# Patient Record
Sex: FEMALE | Race: WHITE | NOT HISPANIC OR LATINO | Employment: FULL TIME | URBAN - METROPOLITAN AREA
[De-identification: names, ages, dates, MRNs, and addresses within clinical notes are randomized per-mention and may not be internally consistent; named-entity substitution may affect disease eponyms.]

---

## 2018-09-06 ENCOUNTER — HISTORICAL (OUTPATIENT)
Dept: ADMINISTRATIVE | Facility: HOSPITAL | Age: 53
End: 2018-09-06

## 2018-09-21 ENCOUNTER — HISTORICAL (OUTPATIENT)
Dept: ADMINISTRATIVE | Facility: HOSPITAL | Age: 53
End: 2018-09-21

## 2018-09-21 LAB
ABS NEUT (OLG): 4
ALBUMIN SERPL-MCNC: 4.1 GM/DL (ref 3.4–5)
ALBUMIN/GLOB SERPL: 1.41 {RATIO} (ref 1.5–2.5)
ALP SERPL-CCNC: 50 UNIT/L (ref 38–126)
ALT SERPL-CCNC: 13 UNIT/L (ref 7–52)
APPEARANCE, UA: CLEAR
AST SERPL-CCNC: 14 UNIT/L (ref 15–37)
BACTERIA #/AREA URNS AUTO: ABNORMAL /HPF
BILIRUB SERPL-MCNC: 0.6 MG/DL (ref 0.2–1)
BILIRUB UR QL STRIP: ABNORMAL MG/DL
BILIRUBIN DIRECT+TOT PNL SERPL-MCNC: 0.2 MG/DL (ref 0–0.5)
BILIRUBIN DIRECT+TOT PNL SERPL-MCNC: 0.4 MG/DL
BUN SERPL-MCNC: 13 MG/DL (ref 7–18)
CALCIUM SERPL-MCNC: 9.1 MG/DL (ref 8.5–10)
CHLORIDE SERPL-SCNC: 102 MMOL/L (ref 98–107)
CHOLEST SERPL-MCNC: 211 MG/DL (ref 0–200)
CHOLEST/HDLC SERPL: 5 {RATIO}
CO2 SERPL-SCNC: 30 MMOL/L (ref 21–32)
COLOR UR: YELLOW
CREAT SERPL-MCNC: 0.78 MG/DL (ref 0.6–1.3)
ERYTHROCYTE [DISTWIDTH] IN BLOOD BY AUTOMATED COUNT: 13 % (ref 11.5–17)
EST. AVERAGE GLUCOSE BLD GHB EST-MCNC: 100 MG/DL
GLOBULIN SER-MCNC: 2.9 GM/DL (ref 1.2–3)
GLUCOSE (UA): NEGATIVE MG/DL
GLUCOSE SERPL-MCNC: 93 MG/DL (ref 74–106)
HBA1C MFR BLD: 5.1 % (ref 4.4–6.4)
HCT VFR BLD AUTO: 43.6 % (ref 37–47)
HDLC SERPL-MCNC: 42 MG/DL (ref 35–60)
HGB BLD-MCNC: 13.8 GM/DL (ref 12–16)
HGB UR QL STRIP: NEGATIVE UNIT/L
KETONES UR QL STRIP: NEGATIVE MG/DL
LDLC SERPL CALC-MCNC: 120 MG/DL (ref 0–129)
LEUKOCYTE ESTERASE UR QL STRIP: NEGATIVE UNIT/L
LYMPHOCYTES # BLD AUTO: 1.3 X10(3)/MCL (ref 0.6–3.4)
LYMPHOCYTES NFR BLD AUTO: 22.3 % (ref 13–40)
MCH RBC QN AUTO: 30.3 PG (ref 27–31.2)
MCHC RBC AUTO-ENTMCNC: 32 GM/DL (ref 32–36)
MCV RBC AUTO: 96 FL (ref 80–94)
MONOCYTES # BLD AUTO: 0.6 X10(3)/MCL (ref 0–1.8)
MONOCYTES NFR BLD AUTO: 10.2 % (ref 0.1–24)
NEUTROPHILS NFR BLD AUTO: 67.5 % (ref 47–80)
NITRITE UR QL STRIP.AUTO: NEGATIVE
PH UR STRIP: 7 [PH]
PLATELET # BLD AUTO: 265 X10(3)/MCL (ref 130–400)
PMV BLD AUTO: 9.3 FL
POTASSIUM SERPL-SCNC: 4.1 MMOL/L (ref 3.5–5.1)
PROT SERPL-MCNC: 7 GM/DL (ref 6.4–8.2)
PROT UR QL STRIP: NEGATIVE MG/DL
RBC # BLD AUTO: 4.55 X10(6)/MCL (ref 4.2–5.4)
RBC #/AREA URNS HPF: ABNORMAL /HPF
SODIUM SERPL-SCNC: 138 MMOL/L (ref 136–145)
SP GR UR STRIP: 1.01
SQUAMOUS EPITHELIAL, UA: ABNORMAL /LPF
TRIGL SERPL-MCNC: 103 MG/DL (ref 30–150)
TSH SERPL-ACNC: 0.86 MIU/ML (ref 0.35–4.94)
UROBILINOGEN UR STRIP-ACNC: 1 MG/DL
VLDLC SERPL CALC-MCNC: 20.6 MG/DL
WBC # SPEC AUTO: 5.9 X10(3)/MCL (ref 4.5–11.5)
WBC #/AREA URNS AUTO: ABNORMAL /[HPF]

## 2018-11-06 ENCOUNTER — HISTORICAL (OUTPATIENT)
Dept: ADMINISTRATIVE | Facility: HOSPITAL | Age: 53
End: 2018-11-06

## 2019-01-03 ENCOUNTER — HISTORICAL (OUTPATIENT)
Dept: ADMINISTRATIVE | Facility: HOSPITAL | Age: 54
End: 2019-01-03

## 2019-01-03 LAB
FLUAV AG NPH QL IA: NEGATIVE
FLUBV AG NPH QL IA: NEGATIVE

## 2019-03-22 ENCOUNTER — HISTORICAL (OUTPATIENT)
Dept: ADMINISTRATIVE | Facility: HOSPITAL | Age: 54
End: 2019-03-22

## 2019-03-22 LAB
CHOLEST SERPL-MCNC: 229 MG/DL (ref 0–200)
CHOLEST/HDLC SERPL: 4.9 {RATIO}
HDLC SERPL-MCNC: 47 MG/DL (ref 35–60)
LDLC SERPL CALC-MCNC: 130 MG/DL (ref 0–129)
TRIGL SERPL-MCNC: 114 MG/DL (ref 30–150)
VLDLC SERPL CALC-MCNC: 22.8 MG/DL

## 2019-07-16 ENCOUNTER — HISTORICAL (OUTPATIENT)
Dept: ADMINISTRATIVE | Facility: HOSPITAL | Age: 54
End: 2019-07-16

## 2019-07-16 LAB
CHOLEST SERPL-MCNC: 131 MG/DL (ref 0–200)
CHOLEST/HDLC SERPL: 3.2 {RATIO}
HDLC SERPL-MCNC: 41 MG/DL (ref 35–60)
LDLC SERPL CALC-MCNC: 56 MG/DL (ref 0–129)
TRIGL SERPL-MCNC: 113 MG/DL (ref 30–150)
VLDLC SERPL CALC-MCNC: 22.6 MG/DL

## 2019-09-27 ENCOUNTER — HISTORICAL (OUTPATIENT)
Dept: ADMINISTRATIVE | Facility: HOSPITAL | Age: 54
End: 2019-09-27

## 2019-09-27 LAB
ABS NEUT (OLG): 3.4 X10(3)/MCL (ref 2.1–9.2)
ALBUMIN SERPL-MCNC: 4.2 GM/DL (ref 3.4–5)
ALBUMIN/GLOB SERPL: 1.91 {RATIO} (ref 1.5–2.5)
ALP SERPL-CCNC: 52 UNIT/L (ref 38–126)
ALT SERPL-CCNC: 13 UNIT/L (ref 7–52)
APPEARANCE, UA: CLEAR
AST SERPL-CCNC: 15 UNIT/L (ref 15–37)
BACTERIA #/AREA URNS AUTO: NORMAL /HPF
BILIRUB SERPL-MCNC: 0.8 MG/DL (ref 0.2–1)
BILIRUB UR QL STRIP: NEGATIVE MG/DL
BILIRUBIN DIRECT+TOT PNL SERPL-MCNC: 0.2 MG/DL (ref 0–0.5)
BILIRUBIN DIRECT+TOT PNL SERPL-MCNC: 0.6 MG/DL
BUN SERPL-MCNC: 18 MG/DL (ref 7–18)
CALCIUM SERPL-MCNC: 9.3 MG/DL (ref 8.5–10)
CHLORIDE SERPL-SCNC: 104 MMOL/L (ref 98–107)
CHOLEST SERPL-MCNC: 210 MG/DL (ref 0–200)
CHOLEST/HDLC SERPL: 3.8 {RATIO}
CO2 SERPL-SCNC: 30 MMOL/L (ref 21–32)
COLOR UR: YELLOW
CREAT SERPL-MCNC: 0.76 MG/DL (ref 0.6–1.3)
ERYTHROCYTE [DISTWIDTH] IN BLOOD BY AUTOMATED COUNT: 13.1 % (ref 11.5–17)
GLOBULIN SER-MCNC: 2.2 GM/DL (ref 1.2–3)
GLUCOSE (UA): NEGATIVE MG/DL
GLUCOSE SERPL-MCNC: 98 MG/DL (ref 74–106)
HCT VFR BLD AUTO: 40.6 % (ref 37–47)
HDLC SERPL-MCNC: 55 MG/DL (ref 35–60)
HGB BLD-MCNC: 14 GM/DL (ref 12–16)
HGB UR QL STRIP: NEGATIVE UNIT/L
KETONES UR QL STRIP: NEGATIVE MG/DL
LDLC SERPL CALC-MCNC: 137 MG/DL (ref 0–129)
LEUKOCYTE ESTERASE UR QL STRIP: NEGATIVE UNIT/L
LYMPHOCYTES # BLD AUTO: 1.9 X10(3)/MCL (ref 0.6–3.4)
LYMPHOCYTES NFR BLD AUTO: 34.8 % (ref 13–40)
MCH RBC QN AUTO: 31.2 PG (ref 27–31.2)
MCHC RBC AUTO-ENTMCNC: 34 GM/DL (ref 32–36)
MCV RBC AUTO: 90 FL (ref 80–94)
MONOCYTES # BLD AUTO: 0.2 X10(3)/MCL (ref 0.1–1.3)
MONOCYTES NFR BLD AUTO: 3.1 % (ref 0.1–24)
NEUTROPHILS NFR BLD AUTO: 62.1 % (ref 47–80)
NITRITE UR QL STRIP.AUTO: NEGATIVE
PH UR STRIP: 7 [PH]
PLATELET # BLD AUTO: 257 X10(3)/MCL (ref 130–400)
PMV BLD AUTO: 9.6 FL (ref 9.4–12.4)
POTASSIUM SERPL-SCNC: 4.7 MMOL/L (ref 3.5–5.1)
PROT SERPL-MCNC: 6.4 GM/DL (ref 6.4–8.2)
PROT UR QL STRIP: NEGATIVE MG/DL
RBC # BLD AUTO: 4.49 X10(6)/MCL (ref 4.2–5.4)
RBC #/AREA URNS HPF: NORMAL /HPF
SODIUM SERPL-SCNC: 141 MMOL/L (ref 136–145)
SP GR UR STRIP: 1.02
SQUAMOUS EPITHELIAL, UA: NORMAL /LPF
TRIGL SERPL-MCNC: 103 MG/DL (ref 30–150)
UROBILINOGEN UR STRIP-ACNC: 0.2 MG/DL
VLDLC SERPL CALC-MCNC: 20.6 MG/DL
WBC # SPEC AUTO: 5.5 X10(3)/MCL (ref 4.5–11.5)
WBC #/AREA URNS AUTO: NORMAL /[HPF]

## 2020-03-16 ENCOUNTER — HISTORICAL (OUTPATIENT)
Dept: ADMINISTRATIVE | Facility: HOSPITAL | Age: 55
End: 2020-03-16

## 2020-03-16 LAB
FLUAV AG NPH QL IA: NEGATIVE
FLUBV AG NPH QL IA: NEGATIVE

## 2020-10-16 ENCOUNTER — HISTORICAL (OUTPATIENT)
Dept: ADMINISTRATIVE | Facility: HOSPITAL | Age: 55
End: 2020-10-16

## 2020-10-16 LAB
ABS NEUT (OLG): 3.5 X10(3)/MCL (ref 2.1–9.2)
ALBUMIN SERPL-MCNC: 4.3 GM/DL (ref 3.4–5)
ALBUMIN/GLOB SERPL: 1.54 {RATIO} (ref 1.5–2.5)
ALP SERPL-CCNC: 55 UNIT/L (ref 38–126)
ALT SERPL-CCNC: 13 UNIT/L (ref 7–52)
APPEARANCE, UA: CLEAR
AST SERPL-CCNC: 17 UNIT/L (ref 15–37)
BACTERIA #/AREA URNS AUTO: NORMAL /HPF
BILIRUB SERPL-MCNC: 0.6 MG/DL (ref 0.2–1)
BILIRUB UR QL STRIP: NEGATIVE MG/DL
BILIRUBIN DIRECT+TOT PNL SERPL-MCNC: 0.1 MG/DL (ref 0–0.5)
BILIRUBIN DIRECT+TOT PNL SERPL-MCNC: 0.5 MG/DL
BUN SERPL-MCNC: 22 MG/DL (ref 7–18)
CALCIUM SERPL-MCNC: 9.7 MG/DL (ref 8.5–10)
CHLORIDE SERPL-SCNC: 100 MMOL/L (ref 98–107)
CHOLEST SERPL-MCNC: 227 MG/DL (ref 0–200)
CHOLEST/HDLC SERPL: 3.8 {RATIO}
CO2 SERPL-SCNC: 31 MMOL/L (ref 21–32)
COLOR UR: YELLOW
CREAT SERPL-MCNC: 0.8 MG/DL (ref 0.6–1.3)
ERYTHROCYTE [DISTWIDTH] IN BLOOD BY AUTOMATED COUNT: 13.4 % (ref 11.5–17)
GLOBULIN SER-MCNC: 2.8 GM/DL (ref 1.2–3)
GLUCOSE (UA): NEGATIVE MG/DL
GLUCOSE SERPL-MCNC: 92 MG/DL (ref 74–106)
HCT VFR BLD AUTO: 42.6 % (ref 37–47)
HDLC SERPL-MCNC: 60 MG/DL (ref 35–60)
HGB BLD-MCNC: 14.4 GM/DL (ref 12–16)
HGB UR QL STRIP: NEGATIVE UNIT/L
KETONES UR QL STRIP: NEGATIVE MG/DL
LDLC SERPL CALC-MCNC: 139 MG/DL (ref 0–129)
LEUKOCYTE ESTERASE UR QL STRIP: NEGATIVE UNIT/L
LYMPHOCYTES # BLD AUTO: 1.9 X10(3)/MCL (ref 0.6–3.4)
LYMPHOCYTES NFR BLD AUTO: 33.8 % (ref 13–40)
MCH RBC QN AUTO: 30.5 PG (ref 27–31.2)
MCHC RBC AUTO-ENTMCNC: 34 GM/DL (ref 32–36)
MCV RBC AUTO: 90 FL (ref 80–94)
MONOCYTES # BLD AUTO: 0.2 X10(3)/MCL (ref 0.1–1.3)
MONOCYTES NFR BLD AUTO: 3.2 % (ref 0.1–24)
NEUTROPHILS NFR BLD AUTO: 63 % (ref 47–80)
NITRITE UR QL STRIP.AUTO: NEGATIVE
PH UR STRIP: 6 [PH]
PLATELET # BLD AUTO: 309 X10(3)/MCL (ref 130–400)
PMV BLD AUTO: 9.5 FL (ref 9.4–12.4)
POTASSIUM SERPL-SCNC: 4.1 MMOL/L (ref 3.5–5.1)
PROT SERPL-MCNC: 7.1 GM/DL (ref 6.4–8.2)
PROT UR QL STRIP: NEGATIVE MG/DL
RBC # BLD AUTO: 4.72 X10(6)/MCL (ref 4.2–5.4)
RBC #/AREA URNS HPF: NORMAL /HPF
SODIUM SERPL-SCNC: 140 MMOL/L (ref 136–145)
SP GR UR STRIP: 1.02
SQUAMOUS EPITHELIAL, UA: NORMAL /LPF
TRIGL SERPL-MCNC: 111 MG/DL (ref 30–150)
TSH SERPL-ACNC: 0.83 MIU/ML (ref 0.35–4.94)
UROBILINOGEN UR STRIP-ACNC: 1 MG/DL
VLDLC SERPL CALC-MCNC: 22.2 MG/DL
WBC # SPEC AUTO: 5.6 X10(3)/MCL (ref 4.5–11.5)
WBC #/AREA URNS AUTO: NORMAL /[HPF]

## 2021-02-23 ENCOUNTER — HISTORICAL (OUTPATIENT)
Dept: ADMINISTRATIVE | Facility: HOSPITAL | Age: 56
End: 2021-02-23

## 2021-02-23 LAB
ALBUMIN SERPL-MCNC: 4 GM/DL (ref 3.4–5)
ALBUMIN/GLOB SERPL: 1.67 {RATIO} (ref 1.5–2.5)
ALP SERPL-CCNC: 54 UNIT/L (ref 38–126)
ALT SERPL-CCNC: 13 UNIT/L (ref 7–52)
AST SERPL-CCNC: 20 UNIT/L (ref 15–37)
BILIRUB SERPL-MCNC: 0.4 MG/DL (ref 0.2–1)
BILIRUBIN DIRECT+TOT PNL SERPL-MCNC: 0.1 MG/DL (ref 0–0.5)
BILIRUBIN DIRECT+TOT PNL SERPL-MCNC: 0.3 MG/DL
BUN SERPL-MCNC: 17 MG/DL (ref 7–18)
CALCIUM SERPL-MCNC: 9.3 MG/DL (ref 8.5–10)
CHLORIDE SERPL-SCNC: 103 MMOL/L (ref 98–107)
CHOLEST SERPL-MCNC: 234 MG/DL (ref 0–200)
CHOLEST/HDLC SERPL: 4.9 {RATIO}
CO2 SERPL-SCNC: 29 MMOL/L (ref 21–32)
CREAT SERPL-MCNC: 0.92 MG/DL (ref 0.6–1.3)
GLOBULIN SER-MCNC: 2.4 GM/DL (ref 1.2–3)
GLUCOSE SERPL-MCNC: 95 MG/DL (ref 74–106)
HDLC SERPL-MCNC: 48 MG/DL (ref 35–60)
LDLC SERPL CALC-MCNC: 141 MG/DL (ref 0–129)
POTASSIUM SERPL-SCNC: 4.5 MMOL/L (ref 3.5–5.1)
PROT SERPL-MCNC: 6.4 GM/DL (ref 6.4–8.2)
SODIUM SERPL-SCNC: 139 MMOL/L (ref 136–145)
TRIGL SERPL-MCNC: 145 MG/DL (ref 30–150)
VLDLC SERPL CALC-MCNC: 29 MG/DL

## 2021-10-26 ENCOUNTER — HISTORICAL (OUTPATIENT)
Dept: ADMINISTRATIVE | Facility: HOSPITAL | Age: 56
End: 2021-10-26

## 2021-10-26 LAB
ABS NEUT (OLG): 3.7 X10(3)/MCL (ref 2.1–9.2)
ALBUMIN SERPL-MCNC: 3.9 GM/DL (ref 3.4–5)
ALBUMIN/GLOB SERPL: 1.44 {RATIO} (ref 1.5–2.5)
ALP SERPL-CCNC: 60 UNIT/L (ref 38–126)
ALT SERPL-CCNC: 16 UNIT/L (ref 7–52)
APPEARANCE, UA: CLEAR
AST SERPL-CCNC: 20 UNIT/L (ref 15–37)
BACTERIA #/AREA URNS AUTO: ABNORMAL /HPF
BILIRUB SERPL-MCNC: 0.6 MG/DL (ref 0.2–1)
BILIRUB UR QL STRIP: NEGATIVE MG/DL
BILIRUBIN DIRECT+TOT PNL SERPL-MCNC: 0.1 MG/DL (ref 0–0.5)
BILIRUBIN DIRECT+TOT PNL SERPL-MCNC: 0.5 MG/DL
BUN SERPL-MCNC: 16 MG/DL (ref 7–18)
CALCIUM SERPL-MCNC: 9.7 MG/DL (ref 8.5–10)
CHLORIDE SERPL-SCNC: 103 MMOL/L (ref 98–107)
CHOLEST SERPL-MCNC: 177 MG/DL (ref 0–200)
CHOLEST/HDLC SERPL: 3.5 {RATIO}
CO2 SERPL-SCNC: 32 MMOL/L (ref 21–32)
COLOR UR: YELLOW
CREAT SERPL-MCNC: 0.77 MG/DL (ref 0.6–1.3)
ERYTHROCYTE [DISTWIDTH] IN BLOOD BY AUTOMATED COUNT: 12.6 % (ref 11.5–17)
GLOBULIN SER-MCNC: 2.7 GM/DL (ref 1.2–3)
GLUCOSE (UA): NEGATIVE MG/DL
GLUCOSE SERPL-MCNC: 103 MG/DL (ref 74–106)
HCT VFR BLD AUTO: 40.4 % (ref 37–47)
HDLC SERPL-MCNC: 50 MG/DL (ref 35–60)
HGB BLD-MCNC: 13.7 GM/DL (ref 12–16)
HGB UR QL STRIP: NEGATIVE UNIT/L
KETONES UR QL STRIP: NEGATIVE MG/DL
LDLC SERPL CALC-MCNC: 75 MG/DL (ref 0–129)
LEUKOCYTE ESTERASE UR QL STRIP: NEGATIVE UNIT/L
LYMPHOCYTES # BLD AUTO: 1.2 X10(3)/MCL (ref 0.6–3.4)
LYMPHOCYTES NFR BLD AUTO: 22.3 % (ref 13–40)
MCH RBC QN AUTO: 30.6 PG (ref 27–31.2)
MCHC RBC AUTO-ENTMCNC: 34 GM/DL (ref 32–36)
MCV RBC AUTO: 90 FL (ref 80–94)
MONOCYTES # BLD AUTO: 0.4 X10(3)/MCL (ref 0.1–1.3)
MONOCYTES NFR BLD AUTO: 7.5 % (ref 0.1–24)
NEUTROPHILS NFR BLD AUTO: 70.2 % (ref 47–80)
NITRITE UR QL STRIP.AUTO: NEGATIVE
PH UR STRIP: 7 [PH]
PLATELET # BLD AUTO: 277 X10(3)/MCL (ref 130–400)
PMV BLD AUTO: 9.5 FL (ref 9.4–12.4)
POTASSIUM SERPL-SCNC: 4.7 MMOL/L (ref 3.5–5.1)
PROT SERPL-MCNC: 6.6 GM/DL (ref 6.4–8.2)
PROT UR QL STRIP: NEGATIVE MG/DL
RBC # BLD AUTO: 4.47 X10(6)/MCL (ref 4.2–5.4)
RBC #/AREA URNS HPF: ABNORMAL /HPF
SODIUM SERPL-SCNC: 141 MMOL/L (ref 136–145)
SP GR UR STRIP: 1.02
SQUAMOUS EPITHELIAL, UA: ABNORMAL /LPF
TRIGL SERPL-MCNC: 128 MG/DL (ref 30–150)
TSH SERPL-ACNC: 1.21 MIU/ML (ref 0.35–4.94)
UROBILINOGEN UR STRIP-ACNC: 0.2 MG/DL
VLDLC SERPL CALC-MCNC: 25.6 MG/DL
WBC # SPEC AUTO: 5.3 X10(3)/MCL (ref 4.5–11.5)
WBC #/AREA URNS AUTO: ABNORMAL /[HPF]

## 2021-11-18 LAB
HUMAN PAPILLOMAVIRUS (HPV): NORMAL
PAP RECOMMENDATION EXT: NORMAL
PAP SMEAR: NORMAL

## 2022-02-11 LAB — BCS RECOMMENDATION EXT: NORMAL

## 2022-04-11 ENCOUNTER — HISTORICAL (OUTPATIENT)
Dept: ADMINISTRATIVE | Facility: HOSPITAL | Age: 57
End: 2022-04-11

## 2022-04-27 VITALS
BODY MASS INDEX: 33.72 KG/M2 | OXYGEN SATURATION: 99 % | WEIGHT: 202.38 LBS | DIASTOLIC BLOOD PRESSURE: 60 MMHG | HEIGHT: 65 IN | SYSTOLIC BLOOD PRESSURE: 110 MMHG

## 2022-10-28 PROBLEM — E78.00 HYPERCHOLESTEREMIA: Status: ACTIVE | Noted: 2022-10-28

## 2022-10-28 PROBLEM — Z00.00 ENCOUNTER FOR WELLNESS EXAMINATION IN ADULT: Status: ACTIVE | Noted: 2022-10-28

## 2022-10-28 PROBLEM — R19.7 DIARRHEA OF PRESUMED INFECTIOUS ORIGIN: Status: ACTIVE | Noted: 2022-10-28

## 2022-10-28 PROBLEM — R53.83 FATIGUE: Status: ACTIVE | Noted: 2022-10-28

## 2022-10-28 PROBLEM — M79.672 PAIN OF LEFT HEEL: Status: ACTIVE | Noted: 2022-10-28

## 2022-10-28 PROBLEM — Z23 IMMUNIZATION DUE: Status: ACTIVE | Noted: 2022-10-28

## 2022-12-28 ENCOUNTER — DOCUMENTATION ONLY (OUTPATIENT)
Dept: ADMINISTRATIVE | Facility: HOSPITAL | Age: 57
End: 2022-12-28

## 2023-01-05 ENCOUNTER — DOCUMENTATION ONLY (OUTPATIENT)
Dept: ADMINISTRATIVE | Facility: HOSPITAL | Age: 58
End: 2023-01-05

## 2023-01-30 PROBLEM — Z00.00 ENCOUNTER FOR WELLNESS EXAMINATION IN ADULT: Status: RESOLVED | Noted: 2022-10-28 | Resolved: 2023-01-30

## 2023-04-28 PROBLEM — M72.2 PLANTAR FASCIITIS, LEFT: Status: ACTIVE | Noted: 2023-04-28

## 2023-04-28 PROBLEM — E66.09 CLASS 1 OBESITY DUE TO EXCESS CALORIES WITHOUT SERIOUS COMORBIDITY WITH BODY MASS INDEX (BMI) OF 34.0 TO 34.9 IN ADULT: Status: ACTIVE | Noted: 2023-04-28

## 2023-04-28 PROBLEM — R74.8 ELEVATED LIVER ENZYMES: Status: ACTIVE | Noted: 2023-04-28

## 2023-04-28 PROBLEM — E66.811 CLASS 1 OBESITY DUE TO EXCESS CALORIES WITHOUT SERIOUS COMORBIDITY WITH BODY MASS INDEX (BMI) OF 34.0 TO 34.9 IN ADULT: Status: ACTIVE | Noted: 2023-04-28

## 2023-04-28 PROBLEM — M79.605 LEFT LEG PAIN: Status: ACTIVE | Noted: 2023-04-28

## 2023-04-28 PROBLEM — E66.9 OBESITY: Status: ACTIVE | Noted: 2023-04-28

## 2023-04-28 PROBLEM — D72.819 LEUKOPENIA: Status: ACTIVE | Noted: 2023-04-28

## 2023-05-31 PROBLEM — R21 RASH: Status: ACTIVE | Noted: 2023-05-31

## 2023-09-07 ENCOUNTER — PATIENT MESSAGE (OUTPATIENT)
Dept: RESEARCH | Facility: HOSPITAL | Age: 58
End: 2023-09-07

## 2023-11-03 PROBLEM — R60.0 BILATERAL LOWER EXTREMITY EDEMA: Status: ACTIVE | Noted: 2023-11-03

## 2023-11-03 PROBLEM — Z83.3 FAMILY HISTORY OF DIABETES MELLITUS: Status: ACTIVE | Noted: 2023-11-03

## 2024-02-05 PROBLEM — Z00.00 ENCOUNTER FOR WELLNESS EXAMINATION IN ADULT: Status: RESOLVED | Noted: 2022-10-28 | Resolved: 2024-02-05

## 2024-05-01 NOTE — PROGRESS NOTES
"Follow-up (6 month recheck/Wants lab work)       HPI:    Patient presents for 6 month recheck.    She has been feeling well.    Her appetite has been too good.  She has been sleeping okay; she falls asleep well but wakes up after 3 hours. Has restless sleep after that. She gets 6-7 hours a night. Hydroxyzine does help; 2-3 times a week.    She is still dealing with left plantar fasciitis. She is now going to [facility]. She is doing therapy. She has inserts. She also has peroneal tendonitis.    Her headaches have been doing well; she averages 1 a week.     Her depression varies. She is aggravated secondary to inability to lose weight.    She wakes up with reflux 2-3 times a week. No belching. She takes over the counter acid reducers. She would like to resume omeprazole.     She has been out of her fluid pills for some time; she has had ankle swelling.      Current Outpatient Medications   Medication Instructions    ALPRAZolam (XANAX) 0.5 MG tablet TAKE 1/2 (ONE-HALF) TABLET BY MOUTH ONCE DAILY AS NEEDED FOR ANXIETY    aspirin-acetaminophen-caffeine 250-250-65 mg (EXCEDRIN MIGRAINE) 250-250-65 mg per tablet 1 tablet, Oral, Every 6 hours PRN    bumetanide (BUMEX) 1 mg, Oral, Daily PRN    diphenhydrAMINE (BENADRYL) 25 mg, Oral    diphenhydrAMINE-acetaminophen (TYLENOL PM)  mg Tab 1 tablet, Oral, Daily    hydrOXYzine HCL (ATARAX) 25 MG tablet Take 1 tablet by mouth in evening for rest.    omeprazole (PRILOSEC) 40 mg, Oral, Daily    simvastatin (ZOCOR) 20 mg, Oral, Nightly         ROS:    She sees Dr Lundberg in June.  She had a mammogram/ultrasound in March.      PE:    ..Visit Vitals  /73   Pulse 83   Temp 98.2 °F (36.8 °C)   Ht 5' 5" (1.651 m)   Wt 94.3 kg (207 lb 12.8 oz)   SpO2 98%   BMI 34.58 kg/m²        General:  She is a well-developed well-nourished obese white female in no apparent distress she is alert and oriented.    Chest: Clear to auscultation bilaterally.    CV:  Regular rate rhythm without murmurs rubs " or gallops.  Bilateral lower extremities:  2+ nonpitting edema noted over ankles/pretibial areas.        1. Generalized anxiety disorder  Overview:  Patient states her anxiety has been stable; she denies any panic attacks.    Continue alprazolam as needed.    11/03/2023:  She takes 1/2 tablet of alprazolam infrequently.    05/03/2024: Her anxiety has been doing well; she infrequently takes 1/2 tablet of alprazolam.    Orders:  -     hydrOXYzine HCL (ATARAX) 25 MG tablet; Take 1 tablet by mouth in evening for rest.  Dispense: 30 tablet; Refill: 5    2. Persistent depressive disorder  Overview:  She has occasional feelings of sadness and depression but is overall doing well.    She denies any suicidal thoughts.    05/03/2024:  Patient states she gets depressed and aggravated at times secondary to inability to lose weight.  She states her depression is otherwise doing well.  She denies any suicidal thoughts; ER precautions given.      3. Primary insomnia  Overview:  Patient sleeps well with Benadryl; she rarely takes hydroxyzine to sleep.    11/03/2023: Patient sleeps well with hydroxyzine; refills sent.    05/03/2024: Patient has been sleeping okay; she sleeps better when she takes hydroxyzine.  She takes this 2 to 3 times a week; refills sent.      4. Other migraine without status migrainosus, not intractable  Overview:  Her migraines have been infrequent.    She gets good relief with Excedrin migraine over-the-counter.    11/03/2023:  Her headaches increase in frequency with increased anxiety.    05/03/2024: Her headaches have been doing well; she has been averaging approximately 1 a week.          5. Gastroesophageal reflux disease, unspecified whether esophagitis present  Overview:  Her reflux has been well controlled.    05/03/2024:  She has been waking up with reflux 2 to 3 times a week.  She would like to resume omeprazole 40 mg daily; refills sent.      6. Bilateral lower extremity edema  Overview:  Stable;  she had recent ultrasounds of bilateral lower extremities.  Ultrasounds did not reveal any venous insufficiency.  Patient advised to take Bumex as needed; she takes it quite infrequently.    05/03/2024: Patient has been out of Bumex; refills sent.    Patient has had lower extremity edema since being out of her Bumex.    Elevate lower extremities.    Limit sodium consumption.    Orders:  -     Comprehensive Metabolic Panel; Future; Expected date: 05/03/2024  -     BNP; Future; Expected date: 05/03/2024    7. Fluid retention  Overview:  Patient reports increased flu since being out of Bumex; refills sent.    Resume Bumex.    Orders:  -     bumetanide (BUMEX) 1 MG tablet; Take 1 tablet (1 mg total) by mouth daily as needed (Swelling).  Dispense: 30 tablet; Refill: 5  -     Comprehensive Metabolic Panel; Future; Expected date: 05/03/2024  -     BNP; Future; Expected date: 05/03/2024    8. Secondary pulmonary arterial hypertension  Overview:  Stable; followed by Dr. Lundberg.  BNP pending.    Orders:  -     Comprehensive Metabolic Panel; Future; Expected date: 05/03/2024  -     BNP; Future; Expected date: 05/03/2024    9. Atherosclerotic heart disease of native coronary artery with other forms of angina pectoris  Overview:  Stable; patient denies any chest pain/pressure/tightness or dyspnea with exertion.    Patient has appointment with Dr. Lundberg in June.    Orders:  -     Comprehensive Metabolic Panel; Future; Expected date: 05/03/2024  -     Lipid Panel; Future; Expected date: 05/03/2024    10. Hypercholesteremia  Overview:  Lipid profile 10/2022: Total cholesterol 150, HDL 39, triglycerides 137 and LDL 71.  Continue low-cholesterol/low-fat diet.    Patient encouraged to lose weight.    Continue current medication.    04/28/2023:  Patient has been off of simvastatin since November.    Check lipid profile today.    Continue low-cholesterol/low-fat diet.        11/03/2023: Patient takes 1/2 tablet of simvastatin 40 mg daily.     Lipid profile pending.    Continue low-cholesterol/low-fat diet.    Patient encouraged to lose weight.  05/31/2023: Patient is back on simvastatin.    Patient advised to take Qunol CoQ10.    05/03/2024:  Patient continues take 1/2 tablet of simvastatin 40 mg daily.    Continue low-cholesterol/low-fat diet.    Lipid profile pending.        Orders:  -     Lipid Panel; Future; Expected date: 05/03/2024    11. MVP (mitral valve prolapse)  Overview:  Stable; followed by Dr. Lundberg.    05/03/2024:  Stable; followed by Dr. Lundberg.      Other orders  -     ALPRAZolam (XANAX) 0.5 MG tablet; TAKE 1/2 (ONE-HALF) TABLET BY MOUTH ONCE DAILY AS NEEDED FOR ANXIETY  Dispense: 20 tablet; Refill: 2  -     omeprazole (PRILOSEC) 40 MG capsule; Take 1 capsule (40 mg total) by mouth once daily.  Dispense: 30 capsule; Refill: 5              ..Follow up in about 6 months (around 11/3/2024) for Wellness.       Future Appointments   Date Time Provider Department Center   11/8/2024  7:30 AM Roly Yuan MD [facility]

## 2024-05-03 ENCOUNTER — OFFICE VISIT (OUTPATIENT)
Dept: PRIMARY CARE CLINIC | Facility: CLINIC | Age: 59
End: 2024-05-03
Payer: COMMERCIAL

## 2024-05-03 VITALS
WEIGHT: 207.81 LBS | BODY MASS INDEX: 34.62 KG/M2 | OXYGEN SATURATION: 98 % | HEIGHT: 65 IN | SYSTOLIC BLOOD PRESSURE: 111 MMHG | TEMPERATURE: 98 F | HEART RATE: 83 BPM | DIASTOLIC BLOOD PRESSURE: 73 MMHG

## 2024-05-03 DIAGNOSIS — F34.1 PERSISTENT DEPRESSIVE DISORDER: ICD-10-CM

## 2024-05-03 DIAGNOSIS — G43.809 OTHER MIGRAINE WITHOUT STATUS MIGRAINOSUS, NOT INTRACTABLE: ICD-10-CM

## 2024-05-03 DIAGNOSIS — I25.118 ATHEROSCLEROTIC HEART DISEASE OF NATIVE CORONARY ARTERY WITH OTHER FORMS OF ANGINA PECTORIS: ICD-10-CM

## 2024-05-03 DIAGNOSIS — I27.21 SECONDARY PULMONARY ARTERIAL HYPERTENSION: ICD-10-CM

## 2024-05-03 DIAGNOSIS — F51.01 PRIMARY INSOMNIA: ICD-10-CM

## 2024-05-03 DIAGNOSIS — I34.1 MVP (MITRAL VALVE PROLAPSE): ICD-10-CM

## 2024-05-03 DIAGNOSIS — R60.0 BILATERAL LOWER EXTREMITY EDEMA: ICD-10-CM

## 2024-05-03 DIAGNOSIS — K21.9 GASTROESOPHAGEAL REFLUX DISEASE, UNSPECIFIED WHETHER ESOPHAGITIS PRESENT: ICD-10-CM

## 2024-05-03 DIAGNOSIS — R60.9 FLUID RETENTION: ICD-10-CM

## 2024-05-03 DIAGNOSIS — E78.00 HYPERCHOLESTEREMIA: ICD-10-CM

## 2024-05-03 DIAGNOSIS — F41.1 GENERALIZED ANXIETY DISORDER: Primary | ICD-10-CM

## 2024-05-03 LAB
ALBUMIN SERPL-MCNC: 4 G/DL (ref 3.4–4.8)
ALBUMIN/GLOB SERPL: 1.2 RATIO (ref 1.1–2)
ALP SERPL-CCNC: 95 UNIT/L (ref 40–150)
ALT SERPL-CCNC: 13 UNIT/L (ref 0–55)
AST SERPL-CCNC: 18 UNIT/L (ref 5–34)
BILIRUB SERPL-MCNC: 0.4 MG/DL
BUN SERPL-MCNC: 20 MG/DL (ref 9.8–20.1)
CALCIUM SERPL-MCNC: 9.6 MG/DL (ref 8.4–10.2)
CHLORIDE SERPL-SCNC: 105 MMOL/L (ref 98–107)
CHOLEST SERPL-MCNC: 203 MG/DL
CHOLEST/HDLC SERPL: 4 {RATIO} (ref 0–5)
CO2 SERPL-SCNC: 27 MMOL/L (ref 23–31)
CREAT SERPL-MCNC: 0.83 MG/DL (ref 0.55–1.02)
GFR SERPLBLD CREATININE-BSD FMLA CKD-EPI: >60 MLS/MIN/1.73/M2
GLOBULIN SER-MCNC: 3.3 GM/DL (ref 2.4–3.5)
GLUCOSE SERPL-MCNC: 98 MG/DL (ref 82–115)
HDLC SERPL-MCNC: 53 MG/DL (ref 35–60)
LDLC SERPL CALC-MCNC: 125 MG/DL (ref 50–140)
POTASSIUM SERPL-SCNC: 4.1 MMOL/L (ref 3.5–5.1)
PROT SERPL-MCNC: 7.3 GM/DL (ref 5.8–7.6)
SODIUM SERPL-SCNC: 142 MMOL/L (ref 136–145)
TRIGL SERPL-MCNC: 127 MG/DL (ref 37–140)
VLDLC SERPL CALC-MCNC: 25 MG/DL

## 2024-05-03 PROCEDURE — 1159F MED LIST DOCD IN RCRD: CPT | Mod: CPTII,,, | Performed by: FAMILY MEDICINE

## 2024-05-03 PROCEDURE — 36415 COLL VENOUS BLD VENIPUNCTURE: CPT | Performed by: FAMILY MEDICINE

## 2024-05-03 PROCEDURE — 99214 OFFICE O/P EST MOD 30 MIN: CPT | Mod: ,,, | Performed by: FAMILY MEDICINE

## 2024-05-03 PROCEDURE — 80061 LIPID PANEL: CPT | Performed by: FAMILY MEDICINE

## 2024-05-03 PROCEDURE — 3074F SYST BP LT 130 MM HG: CPT | Mod: CPTII,,, | Performed by: FAMILY MEDICINE

## 2024-05-03 PROCEDURE — 3008F BODY MASS INDEX DOCD: CPT | Mod: CPTII,,, | Performed by: FAMILY MEDICINE

## 2024-05-03 PROCEDURE — 3078F DIAST BP <80 MM HG: CPT | Mod: CPTII,,, | Performed by: FAMILY MEDICINE

## 2024-05-03 PROCEDURE — 80053 COMPREHEN METABOLIC PANEL: CPT | Performed by: FAMILY MEDICINE

## 2024-05-03 PROCEDURE — 1160F RVW MEDS BY RX/DR IN RCRD: CPT | Mod: CPTII,,, | Performed by: FAMILY MEDICINE

## 2024-05-03 RX ORDER — HYDROXYZINE HYDROCHLORIDE 25 MG/1
TABLET, FILM COATED ORAL
Qty: 30 TABLET | Refills: 5 | Status: SHIPPED | OUTPATIENT
Start: 2024-05-03

## 2024-05-03 RX ORDER — OMEPRAZOLE 40 MG/1
40 CAPSULE, DELAYED RELEASE ORAL DAILY
Qty: 30 CAPSULE | Refills: 5 | Status: SHIPPED | OUTPATIENT
Start: 2024-05-03 | End: 2024-10-30

## 2024-05-03 RX ORDER — BUMETANIDE 1 MG/1
1 TABLET ORAL DAILY PRN
Qty: 30 TABLET | Refills: 5 | Status: SHIPPED | OUTPATIENT
Start: 2024-05-03

## 2024-05-03 RX ORDER — ALPRAZOLAM 0.5 MG/1
TABLET ORAL
Qty: 20 TABLET | Refills: 2 | Status: SHIPPED | OUTPATIENT
Start: 2024-05-03

## 2024-05-06 ENCOUNTER — PATIENT MESSAGE (OUTPATIENT)
Dept: PRIMARY CARE CLINIC | Facility: CLINIC | Age: 59
End: 2024-05-06
Payer: COMMERCIAL

## 2024-05-11 ENCOUNTER — PATIENT MESSAGE (OUTPATIENT)
Dept: PRIMARY CARE CLINIC | Facility: CLINIC | Age: 59
End: 2024-05-11
Payer: COMMERCIAL

## 2024-05-21 ENCOUNTER — TELEPHONE (OUTPATIENT)
Dept: PRIMARY CARE CLINIC | Facility: CLINIC | Age: 59
End: 2024-05-21
Payer: COMMERCIAL

## 2024-05-21 DIAGNOSIS — R60.0 BILATERAL LOWER EXTREMITY EDEMA: ICD-10-CM

## 2024-05-21 DIAGNOSIS — I34.1 MVP (MITRAL VALVE PROLAPSE): Primary | ICD-10-CM

## 2024-05-21 DIAGNOSIS — R60.9 FLUID RETENTION: ICD-10-CM

## 2024-05-21 NOTE — TELEPHONE ENCOUNTER
Spoke with pt. Instructed pt BNP was not collected or resulted. Instructed pt to return to [facility] for lab draw. Pt states has Cardiology appt next month and insurance will make her pay deductible for labs not drawn on office visit. Pt will call insurance to verify but will have lab drawn at cardiology appt if needed.  Recent labs forwarded to Dr Lundberg for review

## 2024-06-07 RX ORDER — PHENTERMINE HYDROCHLORIDE 37.5 MG/1
37.5 TABLET ORAL
Qty: 30 TABLET | Refills: 1 | Status: SHIPPED | OUTPATIENT
Start: 2024-06-07 | End: 2024-08-06

## 2024-06-10 ENCOUNTER — TELEPHONE (OUTPATIENT)
Dept: PRIMARY CARE CLINIC | Facility: CLINIC | Age: 59
End: 2024-06-10
Payer: COMMERCIAL

## 2024-06-10 NOTE — TELEPHONE ENCOUNTER
----- Message from Roly Yuan MD sent at 6/7/2024 12:37 PM CDT -----  Prescription with 1 refills sent.  ----- Message -----  From: Kirti Garibay LPN  Sent: 6/7/2024  11:45 AM CDT  To: Roly Yuan MD      ----- Message -----  From: Fuad Yanes  Sent: 6/6/2024   4:36 PM CDT  To: Kwab[facility]GAR Staff    .Type:  Patient Returning Call    Who Called:pt   Who Left Message for Patient:  Does the patient know what this is regarding?:wants to know can the Rx phentermine (ADIPEX-P) 37.5 mg tablet be renewed   Would the patient rather a call back or a response via [facility]? Call back   Best Call Back Number:[phone]  Additional Information:

## 2024-10-14 NOTE — PROGRESS NOTES
"Arm Pain (Possible pulled muscle left arm x 1 month ago/Wants flu vaccine)       HPI:    Patient presents with left arm pain times one-month.  It hurts over her biceps region.  She denies any injuries accidents or trauma.  She denies any new or repetitive activities.  She has increased discomfort with certain movements.  She denies any history of shoulder issues.  She denies any unusual neck pain.        Current Outpatient Medications   Medication Instructions    ALPRAZolam (XANAX) 0.5 MG tablet TAKE 1/2 (ONE-HALF) TABLET BY MOUTH ONCE DAILY AS NEEDED FOR ANXIETY    aspirin-acetaminophen-caffeine 250-250-65 mg (EXCEDRIN MIGRAINE) 250-250-65 mg per tablet 1 tablet, Every 6 hours PRN    bumetanide (BUMEX) 1 mg, Oral, Daily PRN    diclofenac (VOLTAREN) 75 mg, Oral, 2 times daily    diphenhydrAMINE (BENADRYL) 25 mg    diphenhydrAMINE-acetaminophen (TYLENOL PM)  mg Tab 1 tablet, Daily    hydrOXYzine HCL (ATARAX) 25 MG tablet Take 1 tablet by mouth in evening for rest.    omeprazole (PRILOSEC) 40 mg, Oral, Daily    simvastatin (ZOCOR) 20 mg, Oral, Nightly         ROS:    Her daughter got engaged; she is getting  next year.      PE:    ..Visit Vitals  /75   Pulse 85   Temp 98.7 °F (37.1 °C)   Ht 5' 5" (1.651 m)   Wt 91 kg (200 lb 9.6 oz)   SpO2 98%   BMI 33.38 kg/m²        General: She is well-developed well-nourished white female in no apparent distress.  She is alert and oriented.    Left upper extremity: Normal appearance.  Tender to palpation over paroxysmal anterior humerus at biceps attachment site.  She is also tender over her lateral deltoid.  She has increased discomfort with abduction which is limited.  She has marked increasing discomfort with resisted biceps flexion.  She has limited internal and external rotation.        1. Biceps tendonitis on left  Overview:  10/16/2024:   Patient's presentation and physical exam consistent with tendinitis of left long head of biceps.  Rest/heat.    Start " diclofenac 75 mg twice daily: Take with food.    If symptoms persist, we will send to ortho for injection.      2. Immunization due  Overview:  Influenza vaccine 0.5 IM administered; benefits/potential risks/side effects discussed with patient.    Assessment & Plan:  Influenza 0.5 IM administered; benefits, side effects and risks discussed with patient.    Orders:  -     (VFC) influenza (Flulaval, Fluzone, Fluarix) 45 mcg/0.5 mL IM vaccine (> or = 6 mo) 0.5 mL    Other orders  -     diclofenac (VOLTAREN) 75 MG EC tablet; Take 1 tablet (75 mg total) by mouth 2 (two) times daily.  Dispense: 30 tablet; Refill: 1              ..No follow-ups on file.       Future Appointments   Date Time Provider Department Center   11/8/2024  7:30 AM Roly Yuan MD [facility]

## 2024-10-16 ENCOUNTER — OFFICE VISIT (OUTPATIENT)
Dept: PRIMARY CARE CLINIC | Facility: CLINIC | Age: 59
End: 2024-10-16
Payer: COMMERCIAL

## 2024-10-16 VITALS
OXYGEN SATURATION: 98 % | BODY MASS INDEX: 33.43 KG/M2 | HEIGHT: 65 IN | WEIGHT: 200.63 LBS | SYSTOLIC BLOOD PRESSURE: 119 MMHG | TEMPERATURE: 99 F | DIASTOLIC BLOOD PRESSURE: 75 MMHG | HEART RATE: 85 BPM

## 2024-10-16 DIAGNOSIS — M75.22 BICEPS TENDONITIS ON LEFT: Primary | ICD-10-CM

## 2024-10-16 DIAGNOSIS — Z23 IMMUNIZATION DUE: ICD-10-CM

## 2024-10-16 PROCEDURE — 90471 IMMUNIZATION ADMIN: CPT | Mod: ,,, | Performed by: FAMILY MEDICINE

## 2024-10-16 PROCEDURE — 3008F BODY MASS INDEX DOCD: CPT | Mod: CPTII,,, | Performed by: FAMILY MEDICINE

## 2024-10-16 PROCEDURE — 1159F MED LIST DOCD IN RCRD: CPT | Mod: CPTII,,, | Performed by: FAMILY MEDICINE

## 2024-10-16 PROCEDURE — 90656 IIV3 VACC NO PRSV 0.5 ML IM: CPT | Mod: ,,, | Performed by: FAMILY MEDICINE

## 2024-10-16 PROCEDURE — 1160F RVW MEDS BY RX/DR IN RCRD: CPT | Mod: CPTII,,, | Performed by: FAMILY MEDICINE

## 2024-10-16 PROCEDURE — 3074F SYST BP LT 130 MM HG: CPT | Mod: CPTII,,, | Performed by: FAMILY MEDICINE

## 2024-10-16 PROCEDURE — 3078F DIAST BP <80 MM HG: CPT | Mod: CPTII,,, | Performed by: FAMILY MEDICINE

## 2024-10-16 PROCEDURE — 99214 OFFICE O/P EST MOD 30 MIN: CPT | Mod: 25,,, | Performed by: FAMILY MEDICINE

## 2024-10-16 RX ORDER — DICLOFENAC SODIUM 75 MG/1
75 TABLET, DELAYED RELEASE ORAL 2 TIMES DAILY
Qty: 30 TABLET | Refills: 1 | Status: SHIPPED | OUTPATIENT
Start: 2024-10-16

## 2024-10-29 DIAGNOSIS — Z00.00 WELLNESS EXAMINATION: Primary | ICD-10-CM

## 2024-11-04 NOTE — PROGRESS NOTES
..Annual Exam (Will do labs after appt)       HISTORY OF PRESENT ILLNESS    This 59 y.o. female patient presents to the clinic today for a wellness CPX.  She has been feeling well.    She has been sleeping okay; she sleeps well for 4-5 hours and then is restless.  Her appetite varies.  She is  with 1 child.    She works in [employer].    She does not smoke.    She rarely has alcohol.    She does not have caffeine.  She walks 4 days a week for an hour.  Gyn:  Dr. Walker; last check up , sees her in December.  Last mammogram/ultrasound 2024.    Colonoscopy 2022: Dr. Moisés Dumont; normal, follow-up in 7 years.  Cardio:  Dr. Lundberg, last office visit 2024.. Pt has stress test next month.      The patient's Health Maintenance was reviewed and the following appears to be due at this time:   Health Maintenance Due   Topic Date Due    Hepatitis C Screening  Never done    HIV Screening  Never done    Aspirin/Antiplatelet Therapy  Never done    RSV Vaccine (Age 60+ and Pregnant patients) (1 - Risk 60-74 years 1-dose series) Never done    COVID-19 Vaccine (2024- season) 2024       ..  Past Medical History:   Diagnosis Date    GERD (gastroesophageal reflux disease)     Giant cell tumor     Migraines     MVP (mitral valve prolapse)           ..  Past Surgical History:   Procedure Laterality Date    APPENDECTOMY       SECTION      CHOLECYSTECTOMY  2016    ENDOMETRIAL ABLATION      frozen shoulder Left 10/2024    SPINE SURGERY  2011    TONSILLECTOMY      VARICOSE VEIN STRIPPING            Current Outpatient Medications   Medication Instructions    ALPRAZolam (XANAX) 0.25 mg, Oral, Daily PRN    aspirin-acetaminophen-caffeine 250-250-65 mg (EXCEDRIN MIGRAINE) 250-250-65 mg per tablet 1 tablet, Every 6 hours PRN    bumetanide (BUMEX) 1 mg, Oral, Daily PRN    diclofenac (VOLTAREN) 75 mg, Oral, 2 times daily    diphenhydrAMINE (BENADRYL) 25 mg    diphenhydrAMINE-acetaminophen (TYLENOL  PM)  mg Tab 1 tablet, Daily    hydrOXYzine HCL (ATARAX) 25 MG tablet Take 1 tablet by mouth in evening for rest.    omeprazole (PRILOSEC) 40 mg, Oral, Daily    simvastatin (ZOCOR) 20 mg, Oral, Nightly         ..  Social History     Socioeconomic History    Marital status:     Number of children: 1   Occupational History    Occupation:    Tobacco Use    Smoking status: Never    Smokeless tobacco: Never   Substance and Sexual Activity    Alcohol use: Yes     Alcohol/week: 1.0 standard drink of alcohol     Types: 1 Glasses of wine per week     Comment: rarely    Drug use: Never    Sexual activity: Yes     Partners: Male     Birth control/protection: Partner-Vasectomy, Post-menopausal   Social History Narrative    ** Merged History Encounter **          Social Drivers of Health     Financial Resource Strain: Low Risk  (11/8/2024)    Overall Financial Resource Strain (CARDIA)     Difficulty of Paying Living Expenses: Not hard at all   Food Insecurity: No Food Insecurity (11/8/2024)    Hunger Vital Sign     Worried About Running Out of Food in the Last Year: Never true     Ran Out of Food in the Last Year: Never true   Transportation Needs: No Transportation Needs (11/8/2024)    TRANSPORTATION NEEDS     Transportation : No   Physical Activity: Sufficiently Active (11/8/2024)    Exercise Vital Sign     Days of Exercise per Week: 4 days     Minutes of Exercise per Session: 60 min   Stress: Stress Concern Present (11/8/2024)    [employer] - Occupational Stress Questionnaire     Feeling of Stress : To some extent   Housing Stability: Low Risk  (11/8/2024)    Housing Stability Vital Sign     Unable to Pay for Housing in the Last Year: No     Homeless in the Last Year: No          ..  Family History   Problem Relation Name Age of Onset    Anxiety disorder Mother Lindsay     Coronary artery disease Mother Lindsay     Hypertension Mother Lindsay     Hyperlipidemia Mother Lindsay      Arthritis Mother Lindsay     Depression Mother Lindsay     Hearing loss Mother Lindsay     Hypertension Father Marshall     Anxiety disorder Father Marshall     Hypertension Brother Chase     Hyperlipidemia Brother Chase     Cancer Maternal Grandfather Cherelle     Diabetes Maternal Grandfather Cherelle     Stroke Maternal Grandmother Lisa     Cancer Maternal Aunt Chioma Spear           ..  Review of patient's allergies indicates:   Allergen Reactions    Demerol [meperidine] Other (See Comments)     Shakes      Erythromycin base Rash    Macrolide antibiotics Rash          ..  Immunization History   Administered Date(s) Administered    COVID-19, MRNA, LN-S, PF (Pfizer) (Purple Cap) 03/10/2021, 03/31/2021, 12/11/2021    Influenza (FLUBLOK) - Quadrivalent - Recombinant - PF *Preferred* (egg allergy) 10/28/2022    Influenza - Quadrivalent 09/21/2018, 09/27/2019, 11/03/2023    Influenza - Quadrivalent - PF *Preferred* (6 months and older) 10/08/2014, 09/21/2018, 09/27/2019, 10/16/2020, 10/25/2021    Influenza - Trivalent - Fluarix, Flulaval, Fluzone, Afluria - PF 10/08/2014, 09/22/2016, 09/21/2018, 09/27/2019, 10/16/2020, 10/25/2021, 10/16/2024    MMR 05/03/2019    Pneumococcal Conjugate - 13 Valent 04/02/2015    Tdap 04/02/2015    Zoster Recombinant 09/21/2018, 12/05/2018, 12/05/2018          REVIEW OF SYSTEMS:    GENERAL:  + wt loss: 14 #,  fever, + occasional  fatigue, chills, night sweats or weakness  HEENT: no sore throat, ear pain, sinus pressure, nasal congestion, or rhinorrhea, + hearing loss: aids  VISION: no vision changes, glaucoma, cataracts, + glasses, has check up next week  CARDIAC: no chest pain, palpitations, dyspnea on exertion, orthopnea  RESPIRATORY: no cough, wheezing, sputum production, or SOB  GI: no abdominal pain, n&v, + heartburn on occasion, constipation, has 2 bowel movements a week, diarrhea,  blood in stool or  (+)family history of colon cancer: maternal grandfather  : no dysuria, hematuria, frequency,   "urgency, incontinence,  vaginal discharge,  abnormal vaginal bleeding  MUSC/SKEL:  no myalgia, weakness, + lower extremity edema, takes Lasix every other day, + arthralgia: left adhesive capsulitis, doing PT twice a week, no joint effusion  SKIN:  No rash, hives, itching or sores  NEURO:  + headaches, numbness,  tingling, weakness, or dizziness  PSYCH:  + anxiety,  depression,  irritability,  suicidal ideation or hallucinations  ENDO:  No polyuria, polydipsia,  polyphagia  HEME:  No bruising,  lymphadenopathy, bleeding disorders, no anemia       PHYSICAL EXAM:    Visit Vitals  /75   Pulse 87   Temp 98.8 °F (37.1 °C)   Ht 5' 5" (1.651 m)   Wt 88.9 kg (196 lb)   SpO2 97%   BMI 32.62 kg/m²       GENERAL:  Well-developed well-nourished obese white female in NAD, alert and oriented x 3  SKIN:  no rash or abnormal appearing skin lesions  HEENT:  PERRLA, EOMI, mouth wnl, throat wnl, EAC and TM wnl bilaterally  NECK:  FROM, no lymphadenopathy, no thyroid abnormalities palpable  CHEST:  CTA bilaterally no wheezes, crackles or rubs  CARDIAC:  RRR, no murmurs audible  ABDOMEN:  Soft, nontender, nondistended, NBSx4, no rebound or guarding, no HSM  EXTREMITIES:  no clubbing, cyanosis, or edema.  joints wnl. +2 DP/PT pulse bilaterally  NEURO:  no sensory or motor deficits noted. CN II-XII intact. Gait wnl.           ASSESSMENT AND PLAN     1. Encounter for wellness examination in adult  Overview:  Patient presents for wellness examination.    She has been feeling well.    Patient encouraged to make healthy food choices and limit portions.    Patient encouraged to limit intake of fried foods, processed foods and sugary foods.  Patient encouraged to lose weight.  Patient encouraged to increase physical activity and exercise when her foot is better.  Gyn: Dr. Walker; sees her in December.  Last mammogram/ultrasound 02/2023.  Colonoscopy 03/2022: Dr. Moisés Dumont; normal follow-up in 7 years.    Cardio: ; she had " a stress test, echocardiogram and venous ultrasound of lower extremities this year.   Influenza vaccine administered.    Labs pending.      Assessment & Plan:  Patient presents for wellness examination.    She has been feeling well.  Patient encouraged to continue to lose weight; she has lost 14 lb in the past year.  Patient encouraged to make healthy food choices and limit portions.    Patient encouraged to limit intake of fried foods, processed foods and sugary foods.  Patient encouraged to continue to walk regularly.  Patient diagnosed with a have adhesive capsulitis; she is doing physical therapy twice a week.  Gyn: Dr. Walker; last checkup December 2023; sees her this December.  Last mammogram/ultrasound 02/2024.  Colonoscopy 03/2022: Dr. Moisés Dumont; normal follow-up in 7 years.   Cardio: Dr. Lundberg, last office visit June 2024.  Patient does stress test next month.  Labs pending.      2. Atherosclerotic heart disease of native coronary artery with other forms of angina pectoris  Overview:  Stable; patient denies any chest pain/pressure/tightness or dyspnea with exertion.    Patient has appointment with Dr. Lundberg in June.    Assessment & Plan:  Stable; followed by Dr. Lundberg. She last saw him in June.  Stress test next month.      3. Secondary pulmonary arterial hypertension  Overview:  Stable; followed by Dr. Lundberg.  BNP pending.    Assessment & Plan:  Stable; see overview.      4. MVP (mitral valve prolapse)  Overview:  Stable; followed by Dr. Lundberg.    05/03/2024:  Stable; followed by Dr. Lundberg.    Assessment & Plan:  Stable; see overview.      5. Hypercholesteremia  Overview:  Lipid profile 10/2022: Total cholesterol 150, HDL 39, triglycerides 137 and LDL 71.  Continue low-cholesterol/low-fat diet.    Patient encouraged to lose weight.    Continue current medication.    04/28/2023:  Patient has been off of simvastatin since November.    Check lipid profile today.    Continue low-cholesterol/low-fat  diet.        11/03/2023: Patient takes 1/2 tablet of simvastatin 40 mg daily.    Lipid profile pending.    Continue low-cholesterol/low-fat diet.    Patient encouraged to lose weight.  05/31/2023: Patient is back on simvastatin.    Patient advised to take Qunol CoQ10.    05/03/2024:  Patient continues take 1/2 tablet of simvastatin 40 mg daily.    Continue low-cholesterol/low-fat diet.    Lipid profile pending.        Assessment & Plan:  Stable; see overview.  Lipid profile pending.    Orders:  -     simvastatin (ZOCOR) 40 MG tablet; Take 0.5 tablets (20 mg total) by mouth every evening.  Dispense: 30 tablet; Refill: 11    6. Generalized anxiety disorder  Overview:  Patient states her anxiety has been stable; she denies any panic attacks.    Continue alprazolam as needed.    11/03/2023:  She takes 1/2 tablet of alprazolam infrequently.    05/03/2024: Her anxiety has been doing well; she infrequently takes 1/2 tablet of alprazolam.    Assessment & Plan:  Patient has had more anxiety lately with planning her daughter's wedding.  Change alprazolam prescription to 0.25 mg daily as needed for anxiety; side effects and risks discussed with patient.    Orders:  -     ALPRAZolam (XANAX) 0.25 MG tablet; Take 1 tablet (0.25 mg total) by mouth daily as needed for Anxiety.  Dispense: 30 tablet; Refill: 5  -     hydrOXYzine HCL (ATARAX) 25 MG tablet; Take 1 tablet by mouth in evening for rest.  Dispense: 30 tablet; Refill: 5    7. Persistent depressive disorder  Overview:  She has occasional feelings of sadness and depression but is overall doing well.    She denies any suicidal thoughts.    05/03/2024:  Patient states she gets depressed and aggravated at times secondary to inability to lose weight.  She states her depression is otherwise doing well.  She denies any suicidal thoughts; ER precautions given.    Assessment & Plan:  Patient states her depression is doing well; she denies sadness, depression, crying spells or  suicidal thoughts.  ER precautions given.  Her symptoms are worse when her  is not home.      8. Other migraine without status migrainosus, not intractable  Overview:  Her migraines have been infrequent.    She gets good relief with Excedrin migraine over-the-counter.    11/03/2023:  Her headaches increase in frequency with increased anxiety.    05/03/2024: Her headaches have been doing well; she has been averaging approximately 1 a week.        Assessment & Plan:  Her headaches has been stable; she takes Excedrin migraine as needed.      9. Gastroesophageal reflux disease, unspecified whether esophagitis present  Overview:  Her reflux has been well controlled.    05/03/2024:  She has been waking up with reflux 2 to 3 times a week.  She would like to resume omeprazole 40 mg daily; refills sent.    Assessment & Plan:  Patient continues to have reflux on occasion; she takes omeprazole as needed.      10. Primary insomnia  Overview:  Patient sleeps well with Benadryl; she rarely takes hydroxyzine to sleep.    11/03/2023: Patient sleeps well with hydroxyzine; refills sent.    05/03/2024: Patient has been sleeping okay; she sleeps better when she takes hydroxyzine.  She takes this 2 to 3 times a week; refills sent.    Assessment & Plan:  Stable; see overview.      11. Bilateral lower extremity edema  Overview:  Stable; she had recent ultrasounds of bilateral lower extremities.  Ultrasounds did not reveal any venous insufficiency.  Patient advised to take Bumex as needed; she takes it quite infrequently.    05/03/2024: Patient has been out of Bumex; refills sent.    Patient has had lower extremity edema since being out of her Bumex.    Elevate lower extremities.    Limit sodium consumption.    Assessment & Plan:  Patient takes Bumex as needed for lower extremity edema.  Her lower extremity edema has been doing well.      12. Class 1 obesity due to excess calories without serious comorbidity with body mass index  (BMI) of 34.0 to 34.9 in adult  Overview:  Patient encouraged to make healthy food choices and limit portions.    Patient encouraged to remain physically active and continue exercising.    Start Adipex 37.5 mg; take 1/2 tablet daily to start with to assess tolerance.  Potential side effects and risks discussed with patient.    11/03/2023:  Patient is not taking Adipex secondary to recommendation of her cardiologist.    Patient interested in compound GLP 1 injections.    Assessment & Plan:  Patient following program offered through insurance company; she has lost 14 lb in the past year.  Patient encouraged to continue to lose weight.      13. Fluid retention  -     bumetanide (BUMEX) 1 MG tablet; Take 1 tablet (1 mg total) by mouth daily as needed (Swelling).  Dispense: 30 tablet; Refill: 5    14. Family history of diabetes mellitus  Overview:  Due to patient's family history and obesity, we will screen patient for diabetes mellitus.  A1c pending.    Assessment & Plan:  See overview.  Limit intake of sugary foods and refined carbohydrates.  Patient encouraged to continue to lose weight.      15. Chronic fatigue  Assessment & Plan:  Patient continues to have fatigue at times; probably multifactorial including stress.  Continue to make healthy food choices and limit portions.    Patient encouraged to continue to walk.    Patient encouraged to continue to lose weight.    Labs pending.           ..Follow up in about 6 months (around 5/8/2025) for Follow Up, With labwork prior to visit.     Future Appointments   Date Time Provider Department Center   5/16/2025  7:30 AM Roly Yuan MD [facility]   11/14/2025  7:30 AM Roly Yuan MD [facility]

## 2024-11-08 ENCOUNTER — OFFICE VISIT (OUTPATIENT)
Dept: PRIMARY CARE CLINIC | Facility: CLINIC | Age: 59
End: 2024-11-08
Payer: COMMERCIAL

## 2024-11-08 VITALS
BODY MASS INDEX: 32.65 KG/M2 | HEIGHT: 65 IN | SYSTOLIC BLOOD PRESSURE: 123 MMHG | TEMPERATURE: 99 F | HEART RATE: 87 BPM | DIASTOLIC BLOOD PRESSURE: 75 MMHG | OXYGEN SATURATION: 97 % | WEIGHT: 196 LBS

## 2024-11-08 DIAGNOSIS — G43.809 OTHER MIGRAINE WITHOUT STATUS MIGRAINOSUS, NOT INTRACTABLE: ICD-10-CM

## 2024-11-08 DIAGNOSIS — F34.1 PERSISTENT DEPRESSIVE DISORDER: ICD-10-CM

## 2024-11-08 DIAGNOSIS — F41.1 GENERALIZED ANXIETY DISORDER: ICD-10-CM

## 2024-11-08 DIAGNOSIS — Z00.00 ENCOUNTER FOR WELLNESS EXAMINATION IN ADULT: Primary | ICD-10-CM

## 2024-11-08 DIAGNOSIS — I25.118 ATHEROSCLEROTIC HEART DISEASE OF NATIVE CORONARY ARTERY WITH OTHER FORMS OF ANGINA PECTORIS: ICD-10-CM

## 2024-11-08 DIAGNOSIS — K21.9 GASTROESOPHAGEAL REFLUX DISEASE, UNSPECIFIED WHETHER ESOPHAGITIS PRESENT: ICD-10-CM

## 2024-11-08 DIAGNOSIS — E78.00 HYPERCHOLESTEREMIA: ICD-10-CM

## 2024-11-08 DIAGNOSIS — Z83.3 FAMILY HISTORY OF DIABETES MELLITUS: ICD-10-CM

## 2024-11-08 DIAGNOSIS — I27.21 SECONDARY PULMONARY ARTERIAL HYPERTENSION: ICD-10-CM

## 2024-11-08 DIAGNOSIS — E66.811 CLASS 1 OBESITY DUE TO EXCESS CALORIES WITHOUT SERIOUS COMORBIDITY WITH BODY MASS INDEX (BMI) OF 34.0 TO 34.9 IN ADULT: ICD-10-CM

## 2024-11-08 DIAGNOSIS — E66.09 CLASS 1 OBESITY DUE TO EXCESS CALORIES WITHOUT SERIOUS COMORBIDITY WITH BODY MASS INDEX (BMI) OF 34.0 TO 34.9 IN ADULT: ICD-10-CM

## 2024-11-08 DIAGNOSIS — F51.01 PRIMARY INSOMNIA: ICD-10-CM

## 2024-11-08 DIAGNOSIS — R60.0 BILATERAL LOWER EXTREMITY EDEMA: ICD-10-CM

## 2024-11-08 DIAGNOSIS — R53.82 CHRONIC FATIGUE: ICD-10-CM

## 2024-11-08 DIAGNOSIS — I34.1 MVP (MITRAL VALVE PROLAPSE): ICD-10-CM

## 2024-11-08 DIAGNOSIS — R60.9 FLUID RETENTION: ICD-10-CM

## 2024-11-08 RX ORDER — SIMVASTATIN 40 MG/1
20 TABLET, FILM COATED ORAL NIGHTLY
Qty: 30 TABLET | Refills: 11 | Status: SHIPPED | OUTPATIENT
Start: 2024-11-08

## 2024-11-08 RX ORDER — HYDROXYZINE HYDROCHLORIDE 25 MG/1
TABLET, FILM COATED ORAL
Qty: 30 TABLET | Refills: 5 | Status: SHIPPED | OUTPATIENT
Start: 2024-11-08

## 2024-11-08 RX ORDER — BUMETANIDE 1 MG/1
1 TABLET ORAL DAILY PRN
Qty: 30 TABLET | Refills: 5 | Status: SHIPPED | OUTPATIENT
Start: 2024-11-08

## 2024-11-08 RX ORDER — ALPRAZOLAM 0.25 MG/1
0.25 TABLET ORAL DAILY PRN
Qty: 30 TABLET | Refills: 5 | Status: SHIPPED | OUTPATIENT
Start: 2024-11-08 | End: 2025-05-07

## 2024-11-08 NOTE — ASSESSMENT & PLAN NOTE
Patient continues to have fatigue at times; probably multifactorial including stress.  Continue to make healthy food choices and limit portions.    Patient encouraged to continue to walk.    Patient encouraged to continue to lose weight.    Labs pending.

## 2024-11-08 NOTE — ASSESSMENT & PLAN NOTE
Patient takes Bumex as needed for lower extremity edema.  Her lower extremity edema has been doing well.

## 2024-11-08 NOTE — ASSESSMENT & PLAN NOTE
Patient following program offered through insurance company; she has lost 14 lb in the past year.  Patient encouraged to continue to lose weight.

## 2024-11-08 NOTE — ASSESSMENT & PLAN NOTE
Patient presents for wellness examination.    She has been feeling well.  Patient encouraged to continue to lose weight; she has lost 14 lb in the past year.  Patient encouraged to make healthy food choices and limit portions.    Patient encouraged to limit intake of fried foods, processed foods and sugary foods.  Patient encouraged to continue to walk regularly.  Patient diagnosed with a have adhesive capsulitis; she is doing physical therapy twice a week.  Gyn: Dr. Walker; last checkup December 2023; sees her this December.  Last mammogram/ultrasound 02/2024.  Colonoscopy 03/2022: Dr. Moisés Dumont; normal follow-up in 7 years.   Cardio: Dr. Lundberg, last office visit June 2024.  Patient does stress test next month.  Labs pending.

## 2024-11-08 NOTE — ASSESSMENT & PLAN NOTE
Patient has had more anxiety lately with planning her daughter's wedding.  Change alprazolam prescription to 0.25 mg daily as needed for anxiety; side effects and risks discussed with patient.

## 2024-11-08 NOTE — ASSESSMENT & PLAN NOTE
See overview.  Limit intake of sugary foods and refined carbohydrates.  Patient encouraged to continue to lose weight.

## 2024-11-08 NOTE — ASSESSMENT & PLAN NOTE
Patient states her depression is doing well; she denies sadness, depression, crying spells or suicidal thoughts.  ER precautions given.  Her symptoms are worse when her  is not home.

## 2024-11-11 ENCOUNTER — LAB VISIT (OUTPATIENT)
Dept: LAB | Facility: HOSPITAL | Age: 59
End: 2024-11-11
Attending: FAMILY MEDICINE
Payer: COMMERCIAL

## 2024-11-11 DIAGNOSIS — R60.9 FLUID RETENTION: ICD-10-CM

## 2024-11-11 DIAGNOSIS — I34.1 MVP (MITRAL VALVE PROLAPSE): ICD-10-CM

## 2024-11-11 DIAGNOSIS — R60.0 BILATERAL LOWER EXTREMITY EDEMA: ICD-10-CM

## 2024-11-11 DIAGNOSIS — Z00.00 WELLNESS EXAMINATION: ICD-10-CM

## 2024-11-11 LAB
ALBUMIN SERPL-MCNC: 3.9 G/DL (ref 3.4–4.8)
ALBUMIN/GLOB SERPL: 1.5 RATIO (ref 1.1–2)
ALP SERPL-CCNC: 85 UNIT/L (ref 40–150)
ALT SERPL-CCNC: 10 UNIT/L (ref 0–55)
ANION GAP SERPL CALC-SCNC: 7 MEQ/L
AST SERPL-CCNC: 13 UNIT/L (ref 5–34)
BACTERIA #/AREA URNS AUTO: ABNORMAL /HPF
BASOPHILS # BLD AUTO: 0.02 X10(3)/MCL
BASOPHILS NFR BLD AUTO: 0.5 %
BILIRUB SERPL-MCNC: 0.7 MG/DL
BILIRUB UR QL STRIP.AUTO: NEGATIVE
BNP BLD-MCNC: 28.3 PG/ML
BUN SERPL-MCNC: 16.3 MG/DL (ref 9.8–20.1)
CALCIUM SERPL-MCNC: 9.9 MG/DL (ref 8.4–10.2)
CHLORIDE SERPL-SCNC: 107 MMOL/L (ref 98–107)
CHOLEST SERPL-MCNC: 195 MG/DL
CHOLEST/HDLC SERPL: 4 {RATIO} (ref 0–5)
CLARITY UR: ABNORMAL
CO2 SERPL-SCNC: 28 MMOL/L (ref 23–31)
COLOR UR AUTO: ABNORMAL
CREAT SERPL-MCNC: 0.81 MG/DL (ref 0.55–1.02)
CREAT/UREA NIT SERPL: 20
EOSINOPHIL # BLD AUTO: 0.09 X10(3)/MCL (ref 0–0.9)
EOSINOPHIL NFR BLD AUTO: 2.3 %
ERYTHROCYTE [DISTWIDTH] IN BLOOD BY AUTOMATED COUNT: 14 % (ref 11.5–17)
EST. AVERAGE GLUCOSE BLD GHB EST-MCNC: 105.4 MG/DL
GFR SERPLBLD CREATININE-BSD FMLA CKD-EPI: >60 ML/MIN/1.73/M2
GLOBULIN SER-MCNC: 2.6 GM/DL (ref 2.4–3.5)
GLUCOSE SERPL-MCNC: 101 MG/DL (ref 82–115)
GLUCOSE UR QL STRIP: NORMAL
HBA1C MFR BLD: 5.3 %
HCT VFR BLD AUTO: 41.1 % (ref 37–47)
HDLC SERPL-MCNC: 55 MG/DL (ref 35–60)
HGB BLD-MCNC: 13.3 G/DL (ref 12–16)
HGB UR QL STRIP: ABNORMAL
IMM GRANULOCYTES # BLD AUTO: 0.01 X10(3)/MCL (ref 0–0.04)
IMM GRANULOCYTES NFR BLD AUTO: 0.3 %
KETONES UR QL STRIP: NEGATIVE
LDLC SERPL CALC-MCNC: 112 MG/DL (ref 50–140)
LEUKOCYTE ESTERASE UR QL STRIP: 500
LYMPHOCYTES # BLD AUTO: 1.29 X10(3)/MCL (ref 0.6–4.6)
LYMPHOCYTES NFR BLD AUTO: 33.2 %
MCH RBC QN AUTO: 29.6 PG (ref 27–31)
MCHC RBC AUTO-ENTMCNC: 32.4 G/DL (ref 33–36)
MCV RBC AUTO: 91.3 FL (ref 80–94)
MONOCYTES # BLD AUTO: 0.4 X10(3)/MCL (ref 0.1–1.3)
MONOCYTES NFR BLD AUTO: 10.3 %
MUCOUS THREADS URNS QL MICRO: ABNORMAL /LPF
NEUTROPHILS # BLD AUTO: 2.08 X10(3)/MCL (ref 2.1–9.2)
NEUTROPHILS NFR BLD AUTO: 53.4 %
NITRITE UR QL STRIP: NEGATIVE
NRBC BLD AUTO-RTO: 0 %
PH UR STRIP: 5 [PH]
PLATELET # BLD AUTO: 278 X10(3)/MCL (ref 130–400)
PMV BLD AUTO: 11 FL (ref 7.4–10.4)
POTASSIUM SERPL-SCNC: 4.4 MMOL/L (ref 3.5–5.1)
PROT SERPL-MCNC: 6.5 GM/DL (ref 5.8–7.6)
PROT UR QL STRIP: ABNORMAL
RBC # BLD AUTO: 4.5 X10(6)/MCL (ref 4.2–5.4)
RBC #/AREA URNS AUTO: ABNORMAL /HPF
SODIUM SERPL-SCNC: 142 MMOL/L (ref 136–145)
SP GR UR STRIP.AUTO: 1.02 (ref 1–1.03)
SQUAMOUS #/AREA URNS LPF: ABNORMAL /HPF
TRIGL SERPL-MCNC: 141 MG/DL (ref 37–140)
TSH SERPL-ACNC: 1.33 UIU/ML (ref 0.35–4.94)
UROBILINOGEN UR STRIP-ACNC: NORMAL
VLDLC SERPL CALC-MCNC: 28 MG/DL
WBC # BLD AUTO: 3.89 X10(3)/MCL (ref 4.5–11.5)
WBC #/AREA URNS AUTO: ABNORMAL /HPF

## 2024-11-11 PROCEDURE — 84443 ASSAY THYROID STIM HORMONE: CPT

## 2024-11-11 PROCEDURE — 83036 HEMOGLOBIN GLYCOSYLATED A1C: CPT

## 2024-11-11 PROCEDURE — 87086 URINE CULTURE/COLONY COUNT: CPT

## 2024-11-11 PROCEDURE — 36415 COLL VENOUS BLD VENIPUNCTURE: CPT

## 2024-11-11 PROCEDURE — 80053 COMPREHEN METABOLIC PANEL: CPT

## 2024-11-11 PROCEDURE — 81015 MICROSCOPIC EXAM OF URINE: CPT

## 2024-11-11 PROCEDURE — 80061 LIPID PANEL: CPT

## 2024-11-11 PROCEDURE — 83880 ASSAY OF NATRIURETIC PEPTIDE: CPT

## 2024-11-11 PROCEDURE — 85025 COMPLETE CBC W/AUTO DIFF WBC: CPT

## 2024-11-11 RX ORDER — SIMVASTATIN 40 MG/1
20 TABLET, FILM COATED ORAL NIGHTLY
Qty: 30 TABLET | Refills: 11 | Status: SHIPPED | OUTPATIENT
Start: 2024-11-11

## 2024-11-13 LAB — BACTERIA UR CULT: NO GROWTH

## 2024-11-13 RX ORDER — NITROFURANTOIN 25; 75 MG/1; MG/1
100 CAPSULE ORAL 2 TIMES DAILY
Qty: 10 CAPSULE | Refills: 0 | Status: SHIPPED | OUTPATIENT
Start: 2024-11-13 | End: 2024-11-18

## 2025-03-20 NOTE — PROGRESS NOTES
"Fatigue (X 2 months)       HPI:    Patient presents with fatigue x2 months.  Questioning reveals that patient has been quite depressed and anxious.  She feels overwhelmed.  She is lacking motivation.  She is withdrawn.  She has crying spells.  Her sleep varies; she has problems staying asleep.  She has been binge eating.  She has had increased anxiety.  Her uncle passed away on 12/24/2024; she and her brother made decision to withdraw life support.  Patient is planning her daughter's wedding.  She goes to [place] every few weeks to help her mother with her doctor's appointments.  Patient used to take Zoloft but did not feel it helped her much.    Patient's daughter recently started on Prozac; she is feeling much better.          Current Outpatient Medications   Medication Instructions    ALPRAZolam (XANAX) 0.25 mg, Oral, Daily PRN    aspirin-acetaminophen-caffeine 250-250-65 mg (EXCEDRIN MIGRAINE) 250-250-65 mg per tablet 1 tablet, Every 6 hours PRN    bumetanide (BUMEX) 1 mg, Oral, Daily PRN    diphenhydrAMINE-acetaminophen (TYLENOL PM)  mg Tab 1 tablet, Daily    FLUoxetine 20 mg, Oral, Daily    hydrOXYzine HCL (ATARAX) 25 MG tablet Take 1 tablet by mouth in evening for rest.    omeprazole (PRILOSEC) 40 mg, Oral, Daily    simvastatin (ZOCOR) 20 mg, Oral, Nightly         ROS:    Patient has been seeing Dr. Santoyo at [facility] left shoulder issues.  He believes she has frozen shoulder syndrome.  She has done therapy for 3 months.  She is about to have an MRI.      PE:    ..Visit Vitals  /71   Pulse 90   Temp 99 °F (37.2 °C)   Ht 5' 5" (1.651 m)   Wt 95 kg (209 lb 8 oz)   SpO2 96%   BMI 34.86 kg/m²        General: She is a well-developed well-nourished obese white female in no apparent distress.  She is alert and oriented.  She has a depressed affect.  She is tearful at times.  She interacts well and answers questions appropriately.        1. Moderate episode of recurrent major depressive " disorder  Overview:  She has occasional feelings of sadness and depression but is overall doing well.    She denies any suicidal thoughts.    05/03/2024:  Patient states she gets depressed and aggravated at times secondary to inability to lose weight.  She states her depression is otherwise doing well.  She denies any suicidal thoughts; ER precautions given.    Assessment & Plan:  Patient presents with depression, sadness, crying spells and feeling overwhelmed.  Condition/treatment options discussed with patient.    Daughter recently started Prozac with good response.    Start Prozac 20 mg daily; patient advised it may take 10-14 days to notice a difference and 4-6 weeks for full effect.  Follow-up in one-month.  Call sooner if symptoms worsen or new symptoms develop.      2. Generalized anxiety disorder  Overview:  Patient states her anxiety has been stable; she denies any panic attacks.    Continue alprazolam as needed.    11/03/2023:  She takes 1/2 tablet of alprazolam infrequently.    05/03/2024: Her anxiety has been doing well; she infrequently takes 1/2 tablet of alprazolam.    Assessment & Plan:  Her anxiety has been increased recently.    She has been taking alprazolam 3 to 4 times a week.  Start fluoxetine 20 mg daily.      3. Secondary pulmonary arterial hypertension  Overview:  Stable; followed by Dr. Lundberg.  BNP pending.    Assessment & Plan:  Stable; followed by Dr. Lundberg.       Other orders  -     FLUoxetine 20 MG capsule; Take 1 capsule (20 mg total) by mouth once daily.  Dispense: 30 capsule; Refill: 1              ..Follow up in about 1 month (around 4/25/2025) for Follow Up.       Future Appointments   Date Time Provider Department Center   4/25/2025  9:45 AM Roly Yuan MD [facility]    5/16/2025  7:30 AM Roly Yuan MD [facility]   11/14/2025  7:30 AM Roly Yuan MD [facility]

## 2025-03-25 ENCOUNTER — OFFICE VISIT (OUTPATIENT)
Dept: PRIMARY CARE CLINIC | Facility: CLINIC | Age: 60
End: 2025-03-25
Payer: COMMERCIAL

## 2025-03-25 VITALS
HEART RATE: 90 BPM | WEIGHT: 209.5 LBS | OXYGEN SATURATION: 96 % | SYSTOLIC BLOOD PRESSURE: 118 MMHG | BODY MASS INDEX: 34.91 KG/M2 | DIASTOLIC BLOOD PRESSURE: 71 MMHG | TEMPERATURE: 99 F | HEIGHT: 65 IN

## 2025-03-25 DIAGNOSIS — F33.1 MODERATE EPISODE OF RECURRENT MAJOR DEPRESSIVE DISORDER: Primary | ICD-10-CM

## 2025-03-25 DIAGNOSIS — I27.21 SECONDARY PULMONARY ARTERIAL HYPERTENSION: ICD-10-CM

## 2025-03-25 DIAGNOSIS — F41.1 GENERALIZED ANXIETY DISORDER: ICD-10-CM

## 2025-03-25 RX ORDER — FLUOXETINE HYDROCHLORIDE 20 MG/1
20 CAPSULE ORAL DAILY
Qty: 30 CAPSULE | Refills: 1 | Status: SHIPPED | OUTPATIENT
Start: 2025-03-25 | End: 2025-05-24

## 2025-03-25 NOTE — ASSESSMENT & PLAN NOTE
Patient presents with depression, sadness, crying spells and feeling overwhelmed.  Condition/treatment options discussed with patient.    Daughter recently started Prozac with good response.    Start Prozac 20 mg daily; patient advised it may take 10-14 days to notice a difference and 4-6 weeks for full effect.  Follow-up in one-month.  Call sooner if symptoms worsen or new symptoms develop.

## 2025-03-25 NOTE — ASSESSMENT & PLAN NOTE
Her anxiety has been increased recently.    She has been taking alprazolam 3 to 4 times a week.  Start fluoxetine 20 mg daily.

## 2025-04-22 NOTE — PROGRESS NOTES
"Follow-up (Tachycardia after 10k race x 30-40 minutes/New medication x 1 month)       HPI:    Patient presents for one-month follow-up.  When I saw patient on 03/25/2025 she is depressed and anxious.  She felt overwhelmed.  She is lacking motivation.  She was withdrawn.  She had crying spells.  She had sleep issues and has been eating.  She reported increased anxiety.  Patient was started on Prozac 20 mg daily.      Current Outpatient Medications   Medication Instructions    ALPRAZolam (XANAX) 0.25 mg, Oral, Daily PRN    aspirin-acetaminophen-caffeine 250-250-65 mg (EXCEDRIN MIGRAINE) 250-250-65 mg per tablet 1 tablet, Every 6 hours PRN    bumetanide (BUMEX) 1 mg, Oral, Daily PRN    diphenhydrAMINE-acetaminophen (TYLENOL PM)  mg Tab 1 tablet, Daily    FLUoxetine 40 mg, Oral, Daily    hydrOXYzine HCL (ATARAX) 25 MG tablet Take 1 tablet by mouth in evening for rest.    omeprazole (PRILOSEC) 40 mg, Oral, Daily    simvastatin (ZOCOR) 20 mg, Oral, Nightly         ROS:    Patient went to walk-in clinic last Monday for hoarseness, sore throat and nasal congestion.  Her swabs were negative.  She is prescribed aloe his and given a cortisone shot.  She still has some nasal irritation and headaches.      PE:    ..Visit Vitals  /75   Pulse 76   Temp 98.8 °F (37.1 °C)   Ht 5' 5" (1.651 m)   Wt 93.4 kg (206 lb)   SpO2 97%   BMI 34.28 kg/m²        General: She is a well-developed well-nourished obese white female in no apparent distress.  She is alert and oriented.  She has a depressed affect.  She is tearful at times.  She interacts well and answers questions appropriately.   Nares:  Slightly edematous mucosa/turbinates with mild erythema.  Oropharynx:  Slight erythema.  Neck: Supple without adenopathy.        1. Moderate episode of recurrent major depressive disorder  Overview:  She has occasional feelings of sadness and depression but is overall doing well.    She denies any suicidal thoughts.    05/03/2024:  " Patient states she gets depressed and aggravated at times secondary to inability to lose weight.  She states her depression is otherwise doing well.  She denies any suicidal thoughts; ER precautions given.    Assessment & Plan:  Patient states her depression has improved; she is less sad and has not crying.  She is sleeping better.  She is not binge eating.  Patient denies any harmful or suicidal thoughts; ER precautions given.  We will increase fluoxetine to 40 mg daily.  Patient advised to call in one-month with update.      2. Generalized anxiety disorder  Overview:  Patient states her anxiety has been stable; she denies any panic attacks.    Continue alprazolam as needed.    11/03/2023:  She takes 1/2 tablet of alprazolam infrequently.    05/03/2024: Her anxiety has been doing well; she infrequently takes 1/2 tablet of alprazolam.    Assessment & Plan:  Patient states her anxiety has improved since initiation of fluoxetine; she denies any panic attacks.  Due to patient not being a goal, will increase fluoxetine to 40 mg daily.    Patient advised to call in one-month with update.      3. Upper respiratory tract infection, unspecified type  Assessment & Plan:  Patient diagnosed with upper respiratory infection on 04/14/2025.  Continue Alahist.     Patient advised to use saline nasal spray.  Rest/fluids.    Call if symptoms persist, worsen or new symptoms develop.      Other orders  -     FLUoxetine 40 MG capsule; Take 1 capsule (40 mg total) by mouth once daily.  Dispense: 30 capsule; Refill: 1              ..No follow-ups on file.       Future Appointments   Date Time Provider Department Center   5/16/2025  7:30 AM Roly Yuan MD [facility] [place]    11/14/2025  7:30 AM Roly Yuan MD [facility] [place]

## 2025-04-25 ENCOUNTER — OFFICE VISIT (OUTPATIENT)
Dept: PRIMARY CARE CLINIC | Facility: CLINIC | Age: 60
End: 2025-04-25
Payer: COMMERCIAL

## 2025-04-25 VITALS
WEIGHT: 206 LBS | TEMPERATURE: 99 F | OXYGEN SATURATION: 97 % | BODY MASS INDEX: 34.32 KG/M2 | DIASTOLIC BLOOD PRESSURE: 75 MMHG | SYSTOLIC BLOOD PRESSURE: 128 MMHG | HEART RATE: 76 BPM | HEIGHT: 65 IN

## 2025-04-25 DIAGNOSIS — F41.1 GENERALIZED ANXIETY DISORDER: ICD-10-CM

## 2025-04-25 DIAGNOSIS — J06.9 UPPER RESPIRATORY TRACT INFECTION, UNSPECIFIED TYPE: ICD-10-CM

## 2025-04-25 DIAGNOSIS — F33.1 MODERATE EPISODE OF RECURRENT MAJOR DEPRESSIVE DISORDER: Primary | ICD-10-CM

## 2025-04-25 RX ORDER — FLUOXETINE HYDROCHLORIDE 40 MG/1
40 CAPSULE ORAL DAILY
Qty: 30 CAPSULE | Refills: 1 | Status: SHIPPED | OUTPATIENT
Start: 2025-04-25 | End: 2025-06-24

## 2025-04-25 NOTE — ASSESSMENT & PLAN NOTE
Patient states her depression has improved; she is less sad and has not crying.  She is sleeping better.  She is not binge eating.  Patient denies any harmful or suicidal thoughts; ER precautions given.  We will increase fluoxetine to 40 mg daily.  Patient advised to call in one-month with update.

## 2025-04-25 NOTE — ASSESSMENT & PLAN NOTE
Patient states her anxiety has improved since initiation of fluoxetine; she denies any panic attacks.  Due to patient not being a goal, will increase fluoxetine to 40 mg daily.    Patient advised to call in one-month with update.

## 2025-04-25 NOTE — ASSESSMENT & PLAN NOTE
Patient diagnosed with upper respiratory infection on 04/14/2025.  Continue Alahist.     Patient advised to use saline nasal spray.  Rest/fluids.    Call if symptoms persist, worsen or new symptoms develop.

## 2025-05-31 DIAGNOSIS — F41.1 GENERALIZED ANXIETY DISORDER: ICD-10-CM

## 2025-06-02 ENCOUNTER — OFFICE VISIT (OUTPATIENT)
Dept: PRIMARY CARE CLINIC | Facility: CLINIC | Age: 60
End: 2025-06-02
Payer: COMMERCIAL

## 2025-06-02 VITALS
DIASTOLIC BLOOD PRESSURE: 76 MMHG | HEART RATE: 86 BPM | BODY MASS INDEX: 33.66 KG/M2 | TEMPERATURE: 98 F | WEIGHT: 202 LBS | RESPIRATION RATE: 18 BRPM | OXYGEN SATURATION: 96 % | SYSTOLIC BLOOD PRESSURE: 113 MMHG | HEIGHT: 65 IN

## 2025-06-02 DIAGNOSIS — E66.811 CLASS 1 OBESITY DUE TO EXCESS CALORIES WITHOUT SERIOUS COMORBIDITY WITH BODY MASS INDEX (BMI) OF 34.0 TO 34.9 IN ADULT: Primary | ICD-10-CM

## 2025-06-02 DIAGNOSIS — E66.09 CLASS 1 OBESITY DUE TO EXCESS CALORIES WITHOUT SERIOUS COMORBIDITY WITH BODY MASS INDEX (BMI) OF 34.0 TO 34.9 IN ADULT: Primary | ICD-10-CM

## 2025-06-02 RX ORDER — TIRZEPATIDE 2.5 MG/.5ML
2.5 INJECTION, SOLUTION SUBCUTANEOUS
Qty: 2 ML | Refills: 1 | Status: SHIPPED | OUTPATIENT
Start: 2025-06-02 | End: 2025-08-01

## 2025-06-02 RX ORDER — ALPRAZOLAM 0.25 MG/1
TABLET ORAL
Qty: 30 TABLET | Refills: 1 | Status: SHIPPED | OUTPATIENT
Start: 2025-06-02

## 2025-06-03 ENCOUNTER — PATIENT MESSAGE (OUTPATIENT)
Dept: PRIMARY CARE CLINIC | Facility: CLINIC | Age: 60
End: 2025-06-03
Payer: COMMERCIAL

## 2025-06-06 ENCOUNTER — TELEPHONE (OUTPATIENT)
Dept: PRIMARY CARE CLINIC | Facility: CLINIC | Age: 60
End: 2025-06-06
Payer: COMMERCIAL

## 2025-06-06 DIAGNOSIS — E66.09 CLASS 1 OBESITY DUE TO EXCESS CALORIES WITHOUT SERIOUS COMORBIDITY WITH BODY MASS INDEX (BMI) OF 34.0 TO 34.9 IN ADULT: Primary | ICD-10-CM

## 2025-06-06 DIAGNOSIS — E66.811 CLASS 1 OBESITY DUE TO EXCESS CALORIES WITHOUT SERIOUS COMORBIDITY WITH BODY MASS INDEX (BMI) OF 34.0 TO 34.9 IN ADULT: Primary | ICD-10-CM

## 2025-06-06 DIAGNOSIS — R63.4 WEIGHT LOSS: ICD-10-CM

## 2025-06-10 ENCOUNTER — TELEPHONE (OUTPATIENT)
Dept: PRIMARY CARE CLINIC | Facility: CLINIC | Age: 60
End: 2025-06-10
Payer: COMMERCIAL

## 2025-06-10 DIAGNOSIS — R63.4 WEIGHT LOSS: ICD-10-CM

## 2025-06-10 DIAGNOSIS — E66.811 CLASS 1 OBESITY DUE TO EXCESS CALORIES WITHOUT SERIOUS COMORBIDITY WITH BODY MASS INDEX (BMI) OF 34.0 TO 34.9 IN ADULT: ICD-10-CM

## 2025-06-10 DIAGNOSIS — E66.09 CLASS 1 OBESITY DUE TO EXCESS CALORIES WITHOUT SERIOUS COMORBIDITY WITH BODY MASS INDEX (BMI) OF 34.0 TO 34.9 IN ADULT: ICD-10-CM

## 2025-06-10 NOTE — TELEPHONE ENCOUNTER
Copied from CRM #[id number]. Topic: Medications - Medication Status Check   >> Jun 9, 2025  8:27 AM Fuad wrote:  .Who Called: Zully CHACON Adelaidesukhwinder    Caller is requesting assistance/information from provider's office.    Symptoms (please be specific): pt states that the medication is still getting rejected at [facility] and wants to know what's going on with it. ( [facility])   How long has patient had these symptoms:  n/a   List of preferred pharmacies on file (remove unneeded): [unfilled]  If different, enter pharmacy into here including location and phone number: [email or web address]       Preferred Method of Contact: Phone Call  Patient's Preferred Phone Number on File: [phone]   Best Call Back Number, if different:  Additional Information:

## 2025-06-10 NOTE — TELEPHONE ENCOUNTER
Copied from CRM #[id number]. Topic: Medications - Medication Status Check   >> Jun 9, 2025  8:27 AM Fuad wrote:  .Who Called: Zully CHACON Adelaidesukhwinder    Caller is requesting assistance/information from provider's office.    Symptoms (please be specific): pt states that the medication is still getting rejected at [facility] and wants to know what's going on with it. ( [facility])   How long has patient had these symptoms:  n/a   List of preferred pharmacies on file (remove unneeded): [unfilled]  If different, enter pharmacy into here including location and phone number: [email or web address]       Preferred Method of Contact: Phone Call  Patient's Preferred Phone Number on File: [phone]   Best Call Back Number, if different:  Additional Information:

## 2025-06-12 ENCOUNTER — TELEPHONE (OUTPATIENT)
Dept: PRIMARY CARE CLINIC | Facility: CLINIC | Age: 60
End: 2025-06-12
Payer: COMMERCIAL

## 2025-06-12 NOTE — TELEPHONE ENCOUNTER
Copied from CRM #[id number]. Topic: General Inquiry - Patient Advice  >> Ghulam 10, 2025  2:58 PM Lynette wrote:  Who Called: [facility] pharmacy     Caller is requesting assistance/information from provider's office.    Symptoms (please be specific):    How long has patient had these symptoms:    List of preferred pharmacies on file (remove unneeded): [unfilled]  If different, enter pharmacy into here including location and phone number:       Preferred Method of Contact: Phone Call  Patient's Preferred Phone Number on File: [phone]  Best Call Back Number, if different:  Additional Information:  Clarification on tirzepatide, weight loss, 2.5 mg/0.5 mL Soln

## 2025-06-17 DIAGNOSIS — F41.1 GENERALIZED ANXIETY DISORDER: Primary | ICD-10-CM

## 2025-06-17 RX ORDER — FLUOXETINE HYDROCHLORIDE 40 MG/1
40 CAPSULE ORAL
Qty: 30 CAPSULE | Refills: 0 | Status: SHIPPED | OUTPATIENT
Start: 2025-06-17

## 2025-07-14 DIAGNOSIS — F41.1 GENERALIZED ANXIETY DISORDER: ICD-10-CM

## 2025-07-15 RX ORDER — FLUOXETINE HYDROCHLORIDE 40 MG/1
40 CAPSULE ORAL
Qty: 30 CAPSULE | Refills: 0 | Status: SHIPPED | OUTPATIENT
Start: 2025-07-15

## 2025-07-29 DIAGNOSIS — E66.09 CLASS 1 OBESITY DUE TO EXCESS CALORIES WITHOUT SERIOUS COMORBIDITY WITH BODY MASS INDEX (BMI) OF 34.0 TO 34.9 IN ADULT: Primary | ICD-10-CM

## 2025-07-29 DIAGNOSIS — R63.4 WEIGHT LOSS: ICD-10-CM

## 2025-07-29 DIAGNOSIS — E66.811 CLASS 1 OBESITY DUE TO EXCESS CALORIES WITHOUT SERIOUS COMORBIDITY WITH BODY MASS INDEX (BMI) OF 34.0 TO 34.9 IN ADULT: Primary | ICD-10-CM

## 2025-07-29 RX ORDER — TIRZEPATIDE 5 MG/.5ML
5 INJECTION, SOLUTION SUBCUTANEOUS
Qty: 2 ML | Refills: 1 | Status: SHIPPED | OUTPATIENT
Start: 2025-07-29 | End: 2025-07-29

## 2025-08-06 ENCOUNTER — TELEPHONE (OUTPATIENT)
Dept: PRIMARY CARE CLINIC | Facility: CLINIC | Age: 60
End: 2025-08-06
Payer: COMMERCIAL

## 2025-08-06 DIAGNOSIS — R63.4 WEIGHT LOSS: ICD-10-CM

## 2025-08-06 DIAGNOSIS — E66.09 CLASS 1 OBESITY DUE TO EXCESS CALORIES WITHOUT SERIOUS COMORBIDITY WITH BODY MASS INDEX (BMI) OF 34.0 TO 34.9 IN ADULT: ICD-10-CM

## 2025-08-06 DIAGNOSIS — E66.811 CLASS 1 OBESITY DUE TO EXCESS CALORIES WITHOUT SERIOUS COMORBIDITY WITH BODY MASS INDEX (BMI) OF 34.0 TO 34.9 IN ADULT: ICD-10-CM

## 2025-08-06 NOTE — TELEPHONE ENCOUNTER
Copied from CRM #[id number]. Topic: General Inquiry - Patient Advice  >> Aug 6, 2025  1:09 PM Christy wrote:  .Who Called: Zully Philip    Patient is returning phone call    Who Left Message for Patient: Kirti  Does the patient know what this is regarding?:PT stated pharmacy now needs quantity of vials sent to [facility]-pls submit       Preferred Method of Contact: Phone Call  Patient's Preferred Phone Number on File: [phone]   Best Call Back Number, if different:  Additional Information:  >> Aug 6, 2025  2:20 PM Nurse Saul wrote:    ----- Message -----  From: Christy Isbell  Sent: 8/6/2025   1:11 PM CDT  To: Kwabena Whitehead

## 2025-08-06 NOTE — TELEPHONE ENCOUNTER
Copied from CRM #[id number]. Topic: General Inquiry - Patient Advice  >> Aug 6, 2025  8:51 AM Britt wrote:  Who Called: Zully Philip    Caller is requesting assistance/information from provider's office.    Symptoms (please be specific):    How long has patient had these symptoms:    List of preferred pharmacies on file (remove unneeded): [unfilled]  If different, enter pharmacy into here including location and phone number:       Preferred Method of Contact: Phone Call  Patient's Preferred Phone Number on File: [phone]   Best Call Back Number, if different:  Additional Information: Patient is requesting a call back with confirmation if the prescription for tirzepatide, weight loss, 5 mg/0.5 mL Soln was corrected to just the vile and not the actual pen.  >> Aug 6, 2025  9:34 AM Nurse Saul wrote:    ----- Message -----  From: Britt Mcgee  Sent: 8/6/2025   8:52 AM CDT  To: Kwabena EDGAR Staff

## 2025-08-15 DIAGNOSIS — F41.1 GENERALIZED ANXIETY DISORDER: ICD-10-CM

## 2025-08-15 RX ORDER — FLUOXETINE HYDROCHLORIDE 40 MG/1
40 CAPSULE ORAL
Qty: 30 CAPSULE | Refills: 0 | Status: SHIPPED | OUTPATIENT
Start: 2025-08-15